# Patient Record
Sex: MALE | Race: WHITE | Employment: OTHER | URBAN - METROPOLITAN AREA
[De-identification: names, ages, dates, MRNs, and addresses within clinical notes are randomized per-mention and may not be internally consistent; named-entity substitution may affect disease eponyms.]

---

## 2017-01-16 RX ORDER — TESTOSTERONE 10 MG/.5G
GEL, METERED TOPICAL
Qty: 6 BOTTLE | Refills: 1 | Status: SHIPPED | OUTPATIENT
Start: 2017-01-16 | End: 2017-08-22 | Stop reason: SDUPTHER

## 2017-08-21 NOTE — TELEPHONE ENCOUNTER
Patient's wife, Shane Zhuo, called to ask that you contact [facility] for a refill on her 's Testosterone. Shane Zhou can be reached at:  [phone].       [facility] [phone]  Testosterone   (90-day supply)

## 2017-08-22 RX ORDER — TESTOSTERONE 10 MG/.5G
GEL, METERED TOPICAL
Qty: 6 BOTTLE | Refills: 1 | Status: SHIPPED | OUTPATIENT
Start: 2017-08-22 | End: 2017-12-26 | Stop reason: SDUPTHER

## 2017-11-29 ENCOUNTER — TELEPHONE (OUTPATIENT)
Dept: ENDOCRINOLOGY | Age: 57
End: 2017-11-29

## 2017-11-29 DIAGNOSIS — E29.1 HYPOGONADISM MALE: Primary | ICD-10-CM

## 2017-11-29 NOTE — TELEPHONE ENCOUNTER
----- Message from Tuan Pathak MD sent at 11/29/2017  6:55 AM EST -----      ----- Message -----     From: Tuan Pathak MD     Sent: 11/29/2017       To: Tuan Pathak MD    Mail him a lab slip    ---    completed

## 2017-12-14 LAB
HGB BLD-MCNC: 14.8 G/DL (ref 13–17.7)
PLEASE NOTE:, 188601: NORMAL
PSA SERPL-MCNC: 0.6 NG/ML (ref 0–4)
TESTOST SERPL-MCNC: 1012 NG/DL (ref 264–916)

## 2017-12-26 ENCOUNTER — OFFICE VISIT (OUTPATIENT)
Dept: ENDOCRINOLOGY | Age: 57
End: 2017-12-26

## 2017-12-26 VITALS
WEIGHT: 226.4 LBS | DIASTOLIC BLOOD PRESSURE: 73 MMHG | HEART RATE: 83 BPM | BODY MASS INDEX: 33.53 KG/M2 | OXYGEN SATURATION: 96 % | RESPIRATION RATE: 16 BRPM | HEIGHT: 69 IN | SYSTOLIC BLOOD PRESSURE: 109 MMHG

## 2017-12-26 DIAGNOSIS — E29.1 HYPOGONADISM MALE: Primary | ICD-10-CM

## 2017-12-26 RX ORDER — TESTOSTERONE 10 MG/.5G
GEL, METERED TOPICAL
Qty: 6 BOTTLE | Refills: 1
Start: 2017-12-26 | End: 2018-03-21 | Stop reason: SDUPTHER

## 2017-12-26 NOTE — PATIENT INSTRUCTIONS
1) For the next month, apply 3 pumps 4x/week and 2 pumps 3x/week of the testosterone gel. Then decrease to 2 pumps everyday for the next month and then repeat your labs 3-4 hours after applying in the morning and I'll e-mail you the results. 2) I will mail you a lab slip about 3-4 weeks before your next visit to have your labs repeated 3-4 days prior to your next visit.

## 2017-12-26 NOTE — MR AVS SNAPSHOT
Visit Information Date & Time Provider Department Dept. Phone Encounter #  
 12/26/2017  1:50 PM Angelica Mendes, [address] [place] [address] Diabetes and Endocrinology [id number] Follow-up Instructions Return in about 1 year (around 12/26/2018). Upcoming Health Maintenance Date Due Hepatitis C Screening 1960 DTaP/Tdap/Td series (1 - Tdap) 5/12/1982 FOBT Q 1 YEAR AGE 50-75 5/12/2011 Influenza Age 5 to Adult 8/1/2017 Allergies as of 12/26/2017  Review Complete On: 12/26/2017 By: Angelica Mendes MD  
  
 Severity Noted Reaction Type Reactions Crestor [Rosuvastatin]  08/29/2014    Other (comments) Headache Olmesartan  12/27/2016    Other (comments) Headaches Plavix [Clopidogrel]  10/18/2013    Rash Pravastatin  12/27/2016    Other (comments) Headaches and myalgias Current Immunizations  Never Reviewed No immunizations on file. Not reviewed this visit You Were Diagnosed With   
  
 Codes Comments Hypogonadism male    -  Primary ICD-10-CM: E29.1 ICD-9-CM: 257.2 Vitals BP Pulse Resp Height(growth percentile) Weight(growth percentile) SpO2  
 109/73 83 16 5' 9\" (1.753 m) 226 lb 6.4 oz (102.7 kg) 96% BMI Smoking Status 33.43 kg/m2 Former Smoker Vitals History BMI and BSA Data Body Mass Index Body Surface Area  
 33.43 kg/m 2 2.24 m 2 Preferred Pharmacy Pharmacy Name Phone [facility] - [place], [place] - [address] [place] [address] [phone] Your Updated Medication List  
  
   
This list is accurate as of: 12/26/17  2:47 PM.  Always use your most recent med list.  
  
  
  
  
 AMBIEN 5 mg tablet Generic drug:  zolpidem Take  by mouth nightly as needed for Sleep. amLODIPine-valsartan 5-160 mg per tablet Commonly known as:  Charmel Blaze Take 1 Tab by mouth daily. aspirin 81 mg chewable tablet Take 81 mg by mouth daily. testosterone 10 mg/0.5 gram /actuation Glpm  
Commonly known as:  Korin Ebenezerjairo Apply 3 pump presses 4x/week and 2 pumps 3x/week--Dose change 12/26/17--updated med list--did not send prescription to the pharmacy ZETIA 10 mg tablet Generic drug:  ezetimibe Take 10 mg by mouth daily. We Performed the Following TESTOSTERONE, TOTAL, ADULT MALE [82857 CPT(R)] Follow-up Instructions Return in about 1 year (around 12/26/2018). Patient Instructions 1) For the next month, apply 3 pumps 4x/week and 2 pumps 3x/week of the testosterone gel. Then decrease to 2 pumps everyday for the next month and then repeat your labs 3-4 hours after applying in the morning and I'll e-mail you the results. 2) I will mail you a lab slip about 3-4 weeks before your next visit to have your labs repeated 3-4 days prior to your next visit. Introducing [address]! Dear Polina Rock: Thank you for requesting a [employer] account. Our records indicate that you already have an active [employer] account. You can access your account anytime at [email or web address] Did you know that you can access your hospital and ER discharge instructions at any time in [employer]? You can also review all of your test results from your hospital stay or ER visit. Additional Information If you have questions, please visit the Frequently Asked Questions section of the [employer] website at [email or web address] Remember, [employer] is NOT to be used for urgent needs. For medical emergencies, dial 911. Now available from your iPhone and Android! Please provide this summary of care documentation to your next provider. Your primary care clinician is listed as Gaby Brown. If you have any questions after today's visit, please call [phone].

## 2017-12-26 NOTE — PROGRESS NOTES
Chief Complaint   Patient presents with    Hypogonadism     History of Present Illness: Julia Cheek is a 65 y.o. male here for follow up of hypogonadism. Weight down 1 lbs since last visit in 12/16. Underwent some skin cancer lesions from his back and left buttock over the past year but no other change to his health. Still applying 3 pumps every morning. Lab draw was 3.5 hours after he applied. About 6-8 weeks ago, did try decreasing his dose of fortesta to 2 pumps daily for 2 weeks and didn't feel as well so he went back to 3 pumps per day. May get some chest pain on rare occasions with activity. Does give blood every 54 days and the last time he did this was at the beginning of November. Tends to have more tightness in his legs prior to needing to give blood but feels better after he does this. No trouble with urination. Current Outpatient Prescriptions   Medication Sig    testosterone (FORTESTA) 10 mg/0.5 gram /actuation glpm Apply 3 pump presses daily    amLODIPine-valsartan (EXFORGE) 5-160 mg per tablet Take 1 Tab by mouth daily.  ZETIA 10 mg tablet Take 10 mg by mouth daily.  aspirin 81 mg chewable tablet Take 81 mg by mouth daily.  zolpidem (AMBIEN) 5 mg tablet Take  by mouth nightly as needed for Sleep. No current facility-administered medications for this visit. Allergies   Allergen Reactions    Crestor [Rosuvastatin] Other (comments)     Headache      Olmesartan Other (comments)     Headaches      Plavix [Clopidogrel] Rash    Pravastatin Other (comments)     Headaches and myalgias     Review of Systems:  - Cardiovascular: rare chest pain  - Neurological: no tremors  - Integumentary: skin is normal    Physical Examination:  Blood pressure 109/73, pulse 83, resp. rate 16, height 5' 9\" (1.753 m), weight 226 lb 6.4 oz (102.7 kg), SpO2 96 %.   - General: pleasant, no distress, good eye contact   - Neck: no carotid bruits  - Cardiovascular: regular, normal rate, nl s1 and s2, no m/r/g   - Respiratory: clear to auscultation bilaterally   - Integumentary: skin is normal, no edema   - Gu: prostate non-enlarged, smooth, no nodules, normal rectal tone, no rectal masses  - Psychiatric: normal mood and affect    Data Reviewed:   Component      Latest Ref Rng & Units 12/13/2017 12/13/2017 12/13/2017          12:00 AM 12:00 AM 12:00 AM   Testosterone      264 - 916 ng/dL   1012 (H)   HGB      13.0 - 17.7 g/dL  14.8    Prostate Specific Ag      0.0 - 4.0 ng/mL 0.6         Assessment/Plan:     1. Hypogonadism male: Around age 56, was having fatigue, irritability, low libido and ED. Saw a urologist, Dr. Willis Faith, in [facility] and was recalls his testosterone level was 212 and doesn't recall being evaluated for the cause of the low testosterone. 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 His wife was taught how to give injections and had done this the entire time. Injected testosterone cypionate 200 mg/ml 2 ml = 400 mg every 2 weeks in the upper buttock area and rotates sides. Had been on this dose almost since the beginning. In the summer of 2013, he stopped testosterone for about a month and his level was around 30 off injection. In 8/14 his level was 1046 about 8 days after the injection and his Hgb was slightly high at 17.2. He started donating blood every 56 days prior to being on testosterone and had continued doing this while on testosterone. Repeat level was 1410 5 days after an injection in 8/14 and Hgb was 16.7 so I decreased his dose to 300 mg IM every 2 weeks and has remained on this since then. His prolactin level was normal in 8/14 and his ferritin was slightly low, not high, so he doesn't have hemochromatosis.   Given his CAD, I don't want him on too much of a dose and don't want his Hgb too high to risk a hyperviscous state. His testosterone level was 879 and Hgb 15.8 on 1/5/15. He wanted to change to gel for more consistent levels as he does seem to have a peak and trough effect and these are less likely to raise Hgb level and we changed to fortesta 4 pumps daily at the end of April 2015 and after 3 weeks of application his level was 351 but this was without applying gel the morning of his lab draw. Hgb was 17.7 but was due for blood donation. Repeat level in 9/15 was > 1500 so decreased to 3 pumps per day and level was 748 in 12/15 and 938 in 12/16 so kept his dose the same. T up to 1012 in 12/17 so will taper to 2 pumps 3x/week and 3 pumps 4x/week for 1 month and then down to 2 pumps daily for the next month and then repeat labs and continue to see him annually. - decrease fortesta 2% to 3 pump presses 4x/week and 2 pumps 3x/week for 1 month and then 2 pumps daily  - check total testosterone in 2 months  - check total testosterone and Hgb and PSA prior to next visit   - SOFI done 12/16  - Hgb 16.2 in 12/15, 15.7 in 12/16 and 14.8 in 12/17  - PSA 0.6 in 12/15, 0.7 in 12/16 and 0.6 in 12/17     Patient Instructions   1) For the next month, apply 3 pumps 4x/week and 2 pumps 3x/week of the testosterone gel. Then decrease to 2 pumps everyday for the next month and then repeat your labs 3-4 hours after applying in the morning and I'll e-mail you the results. 2) I will mail you a lab slip about 3-4 weeks before your next visit to have your labs repeated 3-4 days prior to your next visit. Follow-up Disposition:  Return in about 1 year (around 12/26/2018).     Copy sent to:  Dr. Jana Shen follow up: 3/15/18  Component      Latest Ref Rng & Units 3/14/2018          12:00 AM   Testosterone      264 - 916 ng/dL 949 (H)     Sent him the following message through [employer]:  After I saw you in December I asked you to taper your testosterone for one month by applying 3 pumps 4x/week and 2 pumps 3x/week. Then decrease to 2 pumps everyday for the next month and then repeat your labs. Do your labs reflect being on 2 pumps/day or some other dose as your testosterone level is still high at 949 so I will need to decrease your dose from whatever you are currently taking? Let me know when you have a chance. He responded that he had been applying 2 pumps per day so I recommended deceasing to one pump per day. Lab follow up: 5/5/18  Component      Latest Ref Rng & Units 5/3/2018 5/3/2018          12:00 AM 12:00 AM   Testosterone      264 - 916 ng/dL  142 (L)   HGB      13.0 - 17.7 g/dL 15.7      Sent him the following message through [employer]:    Your testosterone is now low at 142. Have you been applying one pump everyday? Have you missed any doses or run out prior to the lab draw? Let me know when you have a chance. Addendum: 5/7/18    We had the following e-mail exchange:    Stephanie Ovalle put this in the mail today. Be sure to apply the gel that morning and repeat your lab 3-4 hours after application.  ===View-only below this line===      ----- Message -----     From: Chloé Rooney     Sent: 5/6/2018 11:55 PM EDT       To: Pk Peterson MD  Subject: RE:lab results    I have been doing one pump a day and I have not run out, however, I had to go for my physical the same day and I am not sure what time I applied the testosterone, or if I forgot all together. Please mail me another lab order and I will repeat the blood test.  Sorry for my mistake. Lab follow up: 5/18/18  Component      Latest Ref Rng & Units 5/17/2018          12:00 AM   Testosterone      264 - 916 ng/dL 740     Sent him the following message through [employer]:  Your testosterone is back to normal at 740. Please stay on one pump of testosterone per day.

## 2018-03-15 LAB — TESTOST SERPL-MCNC: 949 NG/DL (ref 264–916)

## 2018-03-21 ENCOUNTER — TELEPHONE (OUTPATIENT)
Dept: ENDOCRINOLOGY | Age: 58
End: 2018-03-21

## 2018-03-21 DIAGNOSIS — E29.1 HYPOGONADISM MALE: ICD-10-CM

## 2018-03-21 DIAGNOSIS — E29.1 HYPOGONADISM MALE: Primary | ICD-10-CM

## 2018-03-21 RX ORDER — TESTOSTERONE 10 MG/.5G
GEL, METERED TOPICAL
Qty: 6 BOTTLE | Refills: 1
Start: 2018-03-21 | End: 2018-06-06 | Stop reason: SDUPTHER

## 2018-03-21 NOTE — TELEPHONE ENCOUNTER
Patient and spouse was notified of the message noted and she stated that he has being doing the two pumps everyday. She stated that she will change the notification to be able to receive the [facility] messages.

## 2018-05-04 LAB
HGB BLD-MCNC: 15.7 G/DL (ref 13–17.7)
TESTOST SERPL-MCNC: 142 NG/DL (ref 264–916)

## 2018-05-18 LAB — TESTOST SERPL-MCNC: 740 NG/DL (ref 264–916)

## 2018-06-05 ENCOUNTER — TELEPHONE (OUTPATIENT)
Dept: ENDOCRINOLOGY | Age: 58
End: 2018-06-05

## 2018-06-05 NOTE — TELEPHONE ENCOUNTER
Please call pt to let him know he has an unread message in [address] I'm confused, what do you mean they won't cover this?  Did their formulary (list of preferred meds) change?  Are you trying to fill this somewhere else and pay cash?  I'm happy to mail this to you but first please just clarify this. Darryl Briggs.          Previous Messages       ----- Message -----   León Campoverde From: Ron Haynes Sent: 6/3/2018  6:57 PM EDT        To: Kristina Levine MD   Subject: Prescription Question     Can you please send me a prescription for 90 days for my testosterone (for 2 pumps per day)?  I want to use a different pharmacy rather than [facility]. [facility] insurance will not cover this medication.

## 2018-06-06 DIAGNOSIS — E29.1 HYPOGONADISM MALE: ICD-10-CM

## 2018-06-06 RX ORDER — TESTOSTERONE 10 MG/.5G
GEL, METERED TOPICAL
Qty: 3 BOTTLE | Refills: 1 | Status: SHIPPED | OUTPATIENT
Start: 2018-06-06 | End: 2018-06-06 | Stop reason: SDUPTHER

## 2018-06-06 RX ORDER — TESTOSTERONE 10 MG/.5G
GEL, METERED TOPICAL
Qty: 3 BOTTLE | Refills: 1
Start: 2018-06-06 | End: 2019-12-30 | Stop reason: SDUPTHER

## 2018-08-20 ENCOUNTER — TELEPHONE (OUTPATIENT)
Dept: ENDOCRINOLOGY | Age: 58
End: 2018-08-20

## 2018-08-20 NOTE — TELEPHONE ENCOUNTER
Sent him the following message through [employer] and left a vm on his cell phone as listed in the chart:    I spoke with Oliver Kaba from [place] and worked on a prior authorization for your generic testosterone gel over the phone. I was able to get this approved from 8/20/18-8/20/19 with prior authorization number [id number]. She said you should be able to fill your testosterone at your local [facility] to see the cost of this. Let me know if you have any questions about this or trouble getting it filled. The prescription I had given you said 2 pumps daily but I still want you to just apply 1 pump daily.

## 2018-12-30 DIAGNOSIS — E29.1 HYPOGONADISM MALE: Primary | ICD-10-CM

## 2019-01-10 LAB
HGB BLD-MCNC: 14.9 G/DL (ref 13–17.7)
PSA SERPL-MCNC: 1 NG/ML (ref 0–4)
TESTOST SERPL-MCNC: 703 NG/DL (ref 264–916)

## 2019-01-21 ENCOUNTER — OFFICE VISIT (OUTPATIENT)
Dept: ENDOCRINOLOGY | Age: 59
End: 2019-01-21

## 2019-01-21 VITALS
DIASTOLIC BLOOD PRESSURE: 66 MMHG | HEIGHT: 69 IN | BODY MASS INDEX: 33.06 KG/M2 | WEIGHT: 223.2 LBS | HEART RATE: 73 BPM | SYSTOLIC BLOOD PRESSURE: 101 MMHG

## 2019-01-21 DIAGNOSIS — E29.1 HYPOGONADISM MALE: Primary | ICD-10-CM

## 2019-01-21 NOTE — PROGRESS NOTES
Chief Complaint   Patient presents with    Other     Hypogonadism     History of Present Illness: Neil Bright is a 63 y.o. male here for follow up of hypogonadism  Weight down 3 lbs since last visit in 12/18. Since the spring of 2018 has been applying 1 pump per day and tries to alternate thighs. On this dose overall energy is still pretty good. Still giving blood at [facility] every 2 months. Muscle strength is good. No decrease in sex drive or function. Having some fatigue during the day and unclear if he could be on too much blood pressure med so advised him to discuss this with his PCP. Current Outpatient Medications   Medication Sig    testosterone (FORTESTA) 10 mg/0.5 gram /actuation glpm Apply 1 pump daily--Dose change 3/21/18--updated med list--did not send prescription to the pharmacy    amLODIPine-valsartan (EXFORGE) 5-160 mg per tablet Take 1 Tab by mouth daily.  ZETIA 10 mg tablet Take 10 mg by mouth daily.  aspirin 81 mg chewable tablet Take 81 mg by mouth daily.  zolpidem (AMBIEN) 5 mg tablet Take  by mouth nightly as needed for Sleep. No current facility-administered medications for this visit. Allergies   Allergen Reactions    Crestor [Rosuvastatin] Other (comments)     Headache      Olmesartan Other (comments)     Headaches      Plavix [Clopidogrel] Rash    Pravastatin Other (comments)     Headaches and myalgias     Review of Systems:  - Cardiovascular: no chest pain  - Neurological: no tremors  - Integumentary: skin is normal    Physical Examination:  Blood pressure 101/66, pulse 73, height 5' 9\" (1.753 m), weight 223 lb 3.2 oz (101.2 kg).   - General: pleasant, no distress, good eye contact   - Neck: no carotid bruits  - Cardiovascular: regular, normal rate, nl s1 and s2, no m/r/g   - Respiratory: clear to auscultation bilaterally   - Integumentary: skin is normal, no edema  - Psychiatric: normal mood and affect    Data Reviewed:   Component      Latest Ref Rng & Units 1/9/2019 1/9/2019 1/9/2019          12:00 AM 12:00 AM 12:00 AM   Testosterone      264 - 916 ng/dL   703   Prostate Specific Ag      0.0 - 4.0 ng/mL  1.0    HGB      13.0 - 17.7 g/dL 14.9         Assessment/Plan:     1. Hypogonadism male: Around age 56, was having fatigue, irritability, low libido and ED. Saw a urologist, Dr. Tati Hinds, in [address] [place] [address] and was recalls his testosterone level was 212 and doesn't recall being evaluated for the cause of the low testosterone. 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 His wife was taught how to give injections and had done this the entire time. Injected testosterone cypionate 200 mg/ml 2 ml = 400 mg every 2 weeks in the upper buttock area and rotates sides. Had been on this dose almost since the beginning. In the summer of 2013, he stopped testosterone for about a month and his level was around 30 off injection. In 8/14 his level was 1046 about 8 days after the injection and his Hgb was slightly high at 17.2. He started donating blood every 56 days prior to being on testosterone and had continued doing this while on testosterone. Repeat level was 1410 5 days after an injection in 8/14 and Hgb was 16.7 so I decreased his dose to 300 mg IM every 2 weeks and has remained on this since then. His prolactin level was normal in 8/14 and his ferritin was slightly low, not high, so he doesn't have hemochromatosis. Given his CAD, I don't want him on too much of a dose and don't want his Hgb too high to risk a hyperviscous state. His testosterone level was 879 and Hgb 15.8 on 1/5/15.  He wanted to change to gel for more consistent levels as he does seem to have a peak and trough effect and these are less likely to raise Hgb level and we changed to fortesta 4 pumps daily at the end of April 2015 and after 3 weeks of application his level was 351 but this was without applying gel the morning of his lab draw. Hgb was 17.7 but was due for blood donation. Repeat level in 9/15 was > 1500 so decreased to 3 pumps per day and level was 748 in 12/15 and 938 in 12/16 so kept his dose the same. T up to 1012 in 12/17 so tapered to 2 pumps 3x/week and 3 pumps 4x/week for 1 month and then down to 2 pumps daily for the next month and then repeat labs in 3/18 showed T level was still high at 949 so decreased to 1 pump daily and T level low at 142 in 5/18 but didn't apply the gel that morning and repeat 2 weeks later was normal at 765 and still normal at 703 in 1/19 so kept dose the same. - cont fortesta 2% to 1 pump daily  - check total testosterone and Hgb and PSA prior to next visit   - SOFI done 12/16  - Hgb 16.2 in 12/15, 15.7 in 12/16 and 14.8 in 12/17 and 14.9 in 1/19  - PSA 0.6 in 12/15, 0.7 in 12/16 and 0.6 in 12/17 and 1.0 in 1/19     Patient Instructions   1) Your testosterone is still normal on 1 pump per day so stay on this dose. 2) Consider discussing with your PCP about cutting back on your blood pressure pill to 1/2 tab daily or changing to a different one. 3) Your hemoglobin (red blood cell count) is normal and your PSA (prostate cancer blood test) is normal.    4) Let me know when you need a refill on the testosterone. Follow-up Disposition:  Return in about 1 year (around 1/21/2020).     Copy sent to:  Dr. Jarred Alvarado

## 2019-01-21 NOTE — PATIENT INSTRUCTIONS
1) Your testosterone is still normal on 1 pump per day so stay on this dose. 2) Consider discussing with your PCP about cutting back on your blood pressure pill to 1/2 tab daily or changing to a different one. 3) Your hemoglobin (red blood cell count) is normal and your PSA (prostate cancer blood test) is normal.    4) Let me know when you need a refill on the testosterone.

## 2019-10-03 NOTE — PROGRESS NOTES
Called patient for precall and found patient is waiting on lab results and discussion with Dr Romayne Ingle prior to scheduling biopsy. Will cancel current appt and wait for patient or wife to call back to reschedule. Prefers Monday for patient to have day off from work.

## 2019-10-11 RX ORDER — HYDROCHLOROTHIAZIDE 25 MG/1
25 TABLET ORAL DAILY
Status: ON HOLD | COMMUNITY
End: 2019-10-28

## 2019-10-11 RX ORDER — BUMETANIDE 1 MG/1
0.5 TABLET ORAL AS NEEDED
COMMUNITY
End: 2022-01-17

## 2019-10-11 RX ORDER — TELMISARTAN 40 MG/1
40 TABLET ORAL DAILY
COMMUNITY
End: 2020-01-20

## 2019-10-11 NOTE — PROGRESS NOTES
Natell completed with pt's wife, Silvia Lopez:  PT aware of NPO status. NPO after MN night prior to procedure. PT aware of need to hold anticoagulants per protocol. Aware to stop aspirin 7 days prior to procedure. PT aware of potential for sedation administration and need for  at discharge. PT aware of arrival time pre procedure. Arrive at [facility] radiology waiting room on 10/28/19 at 0915 for scheduled procedure to occur at 1045. Pt states no questions at this time. Gave pt [facility] radiology rn phone number [phone].

## 2019-10-16 ENCOUNTER — HOSPITAL ENCOUNTER (OUTPATIENT)
Dept: CT IMAGING | Age: 59
Discharge: HOME | End: 2019-10-16
Attending: INTERNAL MEDICINE

## 2019-10-28 ENCOUNTER — HOSPITAL ENCOUNTER (OUTPATIENT)
Dept: CT IMAGING | Age: 59
Discharge: HOME | End: 2019-10-28
Attending: INTERNAL MEDICINE | Admitting: INTERNAL MEDICINE
Payer: COMMERCIAL

## 2019-10-28 VITALS
WEIGHT: 221.56 LBS | HEART RATE: 97 BPM | OXYGEN SATURATION: 97 % | HEIGHT: 69 IN | DIASTOLIC BLOOD PRESSURE: 83 MMHG | BODY MASS INDEX: 32.82 KG/M2 | SYSTOLIC BLOOD PRESSURE: 139 MMHG | TEMPERATURE: 98 F | RESPIRATION RATE: 16 BRPM

## 2019-10-28 DIAGNOSIS — N18.30 CHRONIC KIDNEY DISEASE, STAGE III (MODERATE) (HCC): ICD-10-CM

## 2019-10-28 LAB
ANION GAP SERPL CALC-SCNC: 3 MMOL/L (ref 3–18)
APTT PPP: 27.7 SEC (ref 23–36.4)
BASOPHILS # BLD: 0 K/UL (ref 0–0.1)
BASOPHILS NFR BLD: 0 % (ref 0–2)
BUN SERPL-MCNC: 19 MG/DL (ref 7–18)
BUN/CREAT SERPL: 21 (ref 12–20)
CALCIUM SERPL-MCNC: 7.5 MG/DL (ref 8.5–10.1)
CHLORIDE SERPL-SCNC: 109 MMOL/L (ref 100–111)
CO2 SERPL-SCNC: 31 MMOL/L (ref 21–32)
CREAT SERPL-MCNC: 0.89 MG/DL (ref 0.6–1.3)
DIFFERENTIAL METHOD BLD: ABNORMAL
EOSINOPHIL # BLD: 0.5 K/UL (ref 0–0.4)
EOSINOPHIL NFR BLD: 8 % (ref 0–5)
ERYTHROCYTE [DISTWIDTH] IN BLOOD BY AUTOMATED COUNT: 12.9 % (ref 11.6–14.5)
GLUCOSE SERPL-MCNC: 91 MG/DL (ref 74–99)
HCT VFR BLD AUTO: 37.1 % (ref 36–48)
HGB BLD-MCNC: 12.6 G/DL (ref 13–16)
INR PPP: 1 (ref 0.8–1.2)
LYMPHOCYTES # BLD: 1 K/UL (ref 0.9–3.6)
LYMPHOCYTES NFR BLD: 15 % (ref 21–52)
MCH RBC QN AUTO: 29.4 PG (ref 24–34)
MCHC RBC AUTO-ENTMCNC: 34 G/DL (ref 31–37)
MCV RBC AUTO: 86.7 FL (ref 74–97)
MONOCYTES # BLD: 0.5 K/UL (ref 0.05–1.2)
MONOCYTES NFR BLD: 8 % (ref 3–10)
NEUTS SEG # BLD: 4.5 K/UL (ref 1.8–8)
NEUTS SEG NFR BLD: 69 % (ref 40–73)
PLATELET # BLD AUTO: 218 K/UL (ref 135–420)
PMV BLD AUTO: 9.4 FL (ref 9.2–11.8)
POTASSIUM SERPL-SCNC: 4.5 MMOL/L (ref 3.5–5.5)
PROTHROMBIN TIME: 12.7 SEC (ref 11.5–15.2)
RBC # BLD AUTO: 4.28 M/UL (ref 4.7–5.5)
SODIUM SERPL-SCNC: 143 MMOL/L (ref 136–145)
WBC # BLD AUTO: 6.5 K/UL (ref 4.6–13.2)

## 2019-10-28 PROCEDURE — 88305 TISSUE EXAM BY PATHOLOGIST: CPT

## 2019-10-28 PROCEDURE — 85730 THROMBOPLASTIN TIME PARTIAL: CPT

## 2019-10-28 PROCEDURE — 85610 PROTHROMBIN TIME: CPT

## 2019-10-28 PROCEDURE — 99152 MOD SED SAME PHYS/QHP 5/>YRS: CPT

## 2019-10-28 PROCEDURE — 80048 BASIC METABOLIC PNL TOTAL CA: CPT

## 2019-10-28 PROCEDURE — 50200 RENAL BIOPSY PERQ: CPT

## 2019-10-28 PROCEDURE — 74011250636 HC RX REV CODE- 250/636

## 2019-10-28 PROCEDURE — 74011000250 HC RX REV CODE- 250

## 2019-10-28 PROCEDURE — 74011250636 HC RX REV CODE- 250/636: Performed by: RADIOLOGY

## 2019-10-28 PROCEDURE — 74011250637 HC RX REV CODE- 250/637: Performed by: RADIOLOGY

## 2019-10-28 PROCEDURE — 85025 COMPLETE CBC W/AUTO DIFF WBC: CPT

## 2019-10-28 RX ORDER — SODIUM CHLORIDE 9 MG/ML
25 INJECTION, SOLUTION INTRAVENOUS CONTINUOUS
Status: DISCONTINUED | OUTPATIENT
Start: 2019-10-28 | End: 2019-10-28 | Stop reason: HOSPADM

## 2019-10-28 RX ORDER — NALOXONE HYDROCHLORIDE 0.4 MG/ML
INJECTION, SOLUTION INTRAMUSCULAR; INTRAVENOUS; SUBCUTANEOUS
Status: DISCONTINUED
Start: 2019-10-28 | End: 2019-10-28 | Stop reason: WASHOUT

## 2019-10-28 RX ORDER — MIDAZOLAM HYDROCHLORIDE 1 MG/ML
.5-4 INJECTION, SOLUTION INTRAMUSCULAR; INTRAVENOUS
Status: DISCONTINUED | OUTPATIENT
Start: 2019-10-28 | End: 2019-10-28 | Stop reason: HOSPADM

## 2019-10-28 RX ORDER — FLAXSEED OIL 1000 MG
CAPSULE ORAL
COMMUNITY
End: 2021-01-18

## 2019-10-28 RX ORDER — VITAMIN E CAP 100 UNIT 100 UNIT
CAP ORAL DAILY
COMMUNITY

## 2019-10-28 RX ORDER — ACETAMINOPHEN 325 MG/1
650 TABLET ORAL
Status: DISCONTINUED | OUTPATIENT
Start: 2019-10-28 | End: 2019-10-28 | Stop reason: HOSPADM

## 2019-10-28 RX ORDER — LIDOCAINE HYDROCHLORIDE 10 MG/ML
INJECTION INFILTRATION; PERINEURAL
Status: COMPLETED
Start: 2019-10-28 | End: 2019-10-28

## 2019-10-28 RX ORDER — LIDOCAINE HYDROCHLORIDE 10 MG/ML
1-20 INJECTION INFILTRATION; PERINEURAL
Status: DISCONTINUED | OUTPATIENT
Start: 2019-10-28 | End: 2019-10-28 | Stop reason: HOSPADM

## 2019-10-28 RX ORDER — HYDRALAZINE HYDROCHLORIDE 20 MG/ML
20 INJECTION INTRAMUSCULAR; INTRAVENOUS ONCE
Status: COMPLETED | OUTPATIENT
Start: 2019-10-28 | End: 2019-10-28

## 2019-10-28 RX ORDER — HYDRALAZINE HYDROCHLORIDE 20 MG/ML
INJECTION INTRAMUSCULAR; INTRAVENOUS
Status: COMPLETED
Start: 2019-10-28 | End: 2019-10-28

## 2019-10-28 RX ORDER — FENTANYL CITRATE 50 UG/ML
INJECTION, SOLUTION INTRAMUSCULAR; INTRAVENOUS
Status: COMPLETED
Start: 2019-10-28 | End: 2019-10-28

## 2019-10-28 RX ORDER — MIDAZOLAM HYDROCHLORIDE 1 MG/ML
INJECTION, SOLUTION INTRAMUSCULAR; INTRAVENOUS
Status: DISCONTINUED
Start: 2019-10-28 | End: 2019-10-28 | Stop reason: WASHOUT

## 2019-10-28 RX ORDER — HYDROCODONE BITARTRATE AND ACETAMINOPHEN 5; 325 MG/1; MG/1
1 TABLET ORAL
Status: DISCONTINUED | OUTPATIENT
Start: 2019-10-28 | End: 2019-10-28 | Stop reason: HOSPADM

## 2019-10-28 RX ORDER — FENTANYL CITRATE 50 UG/ML
25-200 INJECTION, SOLUTION INTRAMUSCULAR; INTRAVENOUS
Status: DISCONTINUED | OUTPATIENT
Start: 2019-10-28 | End: 2019-10-28 | Stop reason: HOSPADM

## 2019-10-28 RX ORDER — SIMVASTATIN 10 MG/1
10 TABLET, FILM COATED ORAL
COMMUNITY
End: 2022-01-17

## 2019-10-28 RX ORDER — HYDRALAZINE HYDROCHLORIDE 20 MG/ML
10 INJECTION INTRAMUSCULAR; INTRAVENOUS
Status: COMPLETED | OUTPATIENT
Start: 2019-10-28 | End: 2019-10-28

## 2019-10-28 RX ORDER — FLUMAZENIL 0.1 MG/ML
INJECTION INTRAVENOUS
Status: DISCONTINUED
Start: 2019-10-28 | End: 2019-10-28 | Stop reason: WASHOUT

## 2019-10-28 RX ORDER — FLUMAZENIL 0.1 MG/ML
0.2 INJECTION INTRAVENOUS
Status: DISCONTINUED | OUTPATIENT
Start: 2019-10-28 | End: 2019-10-28 | Stop reason: HOSPADM

## 2019-10-28 RX ORDER — ACETAMINOPHEN 325 MG/1
650 TABLET ORAL
COMMUNITY
End: 2022-01-17

## 2019-10-28 RX ORDER — NALOXONE HYDROCHLORIDE 0.4 MG/ML
0.4 INJECTION, SOLUTION INTRAMUSCULAR; INTRAVENOUS; SUBCUTANEOUS AS NEEDED
Status: DISCONTINUED | OUTPATIENT
Start: 2019-10-28 | End: 2019-10-28 | Stop reason: HOSPADM

## 2019-10-28 RX ORDER — LANOLIN ALCOHOL/MO/W.PET/CERES
400 CREAM (GRAM) TOPICAL DAILY
COMMUNITY
End: 2021-01-18

## 2019-10-28 RX ADMIN — HYDRALAZINE HYDROCHLORIDE 10 MG: 20 INJECTION INTRAMUSCULAR; INTRAVENOUS at 11:28

## 2019-10-28 RX ADMIN — LIDOCAINE HYDROCHLORIDE 7 ML: 10 INJECTION INFILTRATION; PERINEURAL at 11:41

## 2019-10-28 RX ADMIN — LIDOCAINE HYDROCHLORIDE 7 ML: 10 INJECTION, SOLUTION INFILTRATION; PERINEURAL at 11:41

## 2019-10-28 RX ADMIN — FENTANYL CITRATE 50 MCG: 50 INJECTION INTRAMUSCULAR; INTRAVENOUS at 11:25

## 2019-10-28 RX ADMIN — FENTANYL CITRATE 50 MCG: 50 INJECTION INTRAMUSCULAR; INTRAVENOUS at 11:15

## 2019-10-28 RX ADMIN — HYDRALAZINE HYDROCHLORIDE 10 MG: 20 INJECTION INTRAMUSCULAR; INTRAVENOUS at 11:35

## 2019-10-28 RX ADMIN — SODIUM CHLORIDE 25 ML/HR: 900 INJECTION, SOLUTION INTRAVENOUS at 09:33

## 2019-10-28 RX ADMIN — HYDROCODONE BITARTRATE AND ACETAMINOPHEN 1 TABLET: 5; 325 TABLET ORAL at 13:13

## 2019-10-28 NOTE — PROGRESS NOTES
Vascular & Interventional Radiology Brief Procedure Note    Interventional Radiologist: Carlita Trammell MD    Assistants: None    Pre-operative Diagnosis:  Renal failure    Post-operative Diagnosis: Same as pre-op dx    Procedure(s) Performed:  CT guided left medical renal biopsy    Anesthesia:  Local and IV fentanyl    Findings:  4 good cores    Complications: No immediate    Estimated Blood Loss:  minimal    Tubes and Drains: None    Specimens: processed by path present at procedure    Condition: Good    Disposition:   To recovery    Plan: discharge      Carlita Trammell MD  [facility]  Vascular & Interventional Radiology  10/28/2019

## 2019-10-28 NOTE — DISCHARGE INSTRUCTIONS
Patient Education        Needle Kidney Biopsy: What to Expect at Home  Your Recovery    After a needle kidney biopsy, you will be told to lie down on your back for several hours. After this, you should avoid strenuous activity for the next 2 to 3 days. It's normal to feel some soreness in the area of the biopsy for 2 to 3 days. You may have a small amount of bleeding on the bandage after the biopsy. You may notice some blood in your urine after the biopsy. This should go away within 12 to 24 hours. If it doesn't, call your doctor. If you are feeling well, you should be able to get back to work within a week. But avoid strenuous activity, such as heavy lifting, for up to another week. This care sheet gives you a general idea about how long it will take for you to recover. But each person recovers at a different pace. Follow the steps below to feel better as quickly as possible. How can you care for yourself at home? Activity    · Avoid lifting anything that would make you strain. This may include heavy grocery bags and milk containers, a heavy briefcase or backpack, cat litter or dog food bags, a vacuum , or a child.     · Avoid strenuous activities, such as bicycle riding, jogging, weight lifting, or aerobic exercise, until your doctor says it is okay.     · Try to walk each day. Start by walking a little more than you did the day before. Bit by bit, increase the amount you walk. Walking boosts blood flow and helps prevent pneumonia and constipation.     · Rest when you feel tired. Getting enough sleep will help you recover.     · Ask your doctor when it is okay for you to have sex. Diet    · You can eat your normal diet. If your stomach is upset, try bland, low-fat foods like plain rice, broiled chicken, toast, and yogurt.     · Drink plenty of fluids to avoid becoming dehydrated. Medicines    · Your doctor will tell you if and when you can restart your medicines.  He or she will also give you instructions about taking any new medicines.     · If you take blood thinners, such as warfarin (Coumadin), clopidogrel (Plavix), or aspirin, be sure to talk to your doctor. He or she will tell you if and when to start taking those medicines again. Make sure that you understand exactly what your doctor wants you to do.     · Do not take aspirin or anti-inflammatory medicines for a week after the biopsy. Incision care    · Keep a bandage over the puncture site for the first 1 or 2 days. Follow-up care is a key part of your treatment and safety. Be sure to make and go to all appointments, and call your doctor if you are having problems. It's also a good idea to know your test results and keep a list of the medicines you take. When should you call for help? Call 911 anytime you think you may need emergency care. For example, call if:    · You passed out (lost consciousness).    Call your doctor now or seek immediate medical care if:    · You have signs of infection, such as:  ? Increased pain, swelling, warmth, or redness. ? Red streaks leading from the puncture site. ? Pus draining from the puncture site. ? A fever.     · Bright red blood has soaked through the bandage over the puncture site.     · You have new or worse pain at the puncture site.    Watch closely for any changes in your health, and call your doctor if:    · You have blood in your urine for more than 1 day. Where can you learn more? Go to http://anni-jose.info/. Enter S675 in the search box to learn more about \"Needle Kidney Biopsy: What to Expect at Home. \"  Current as of: October 31, 2018  Content Version: 12.2  © 6299-3553 [employer]wise, Inc[employer]. Care instructions adapted under license by [employer] (which disclaims liability or warranty for this information).  If you have questions about a medical condition or this instruction, always ask your healthcare professional. [address]  any warranty or liability for your use of this information. DISCHARGE SUMMARY from Nurse    PATIENT INSTRUCTIONS:    After general anesthesia or intravenous sedation, for 24 hours or while taking prescription Narcotics:  · Limit your activities  · Do not drive and operate hazardous machinery  · Do not make important personal or business decisions  · Do  not drink alcoholic beverages  · If you have not urinated within 8 hours after discharge, please contact your surgeon on call. Report the following to your surgeon:  · Excessive pain, swelling, redness or odor of or around the surgical area  · Temperature over 100.5  · Nausea and vomiting lasting longer than 4 hours or if unable to take medications  · Any signs of decreased circulation or nerve impairment to extremity: change in color, persistent  numbness, tingling, coldness or increase pain  · Any questions    What to do at Home:  Recommended activity: Activity as tolerated,    *  Please give a list of your current medications to your Primary Care Provider. *  Please update this list whenever your medications are discontinued, doses are      changed, or new medications (including over-the-counter products) are added. *  Please carry medication information at all times in case of emergency situations. These are general instructions for a healthy lifestyle:    No smoking/ No tobacco products/ Avoid exposure to second hand smoke  Surgeon General's Warning:  Quitting smoking now greatly reduces serious risk to your health.     Obesity, smoking, and sedentary lifestyle greatly increases your risk for illness    A healthy diet, regular physical exercise & weight monitoring are important for maintaining a healthy lifestyle    You may be retaining fluid if you have a history of heart failure or if you experience any of the following symptoms:  Weight gain of 3 pounds or more overnight or 5 pounds in a week, increased swelling in our hands or feet or shortness of breath while lying flat in bed. Please call your doctor as soon as you notice any of these symptoms; do not wait until your next office visit. The discharge information has been reviewed with the patient and spouse. The patient and spouse verbalized understanding. Discharge medications reviewed with the patient and spouse and appropriate educational materials and side effects teaching were provided.     Patient armband removed and shredded    ___________________________________________________________________________________________________________________________________

## 2019-10-28 NOTE — PROGRESS NOTES
Back from procedure. Band-aid intact to left flank, dry & intact. 1300 Diet given. 1313 Pain med given for h/a 6/10. See mar. 1400 h/a improved 1/10  1530 Ambulated to bathroom, voided clear yellow urine. Back to bed. 1545 Discharge instructions reviewed, verbalized understanding. 1600 Discharged in home in care of wife in stable condition via w/c. Denies pain. Band-aid to left flank dry & intact.

## 2019-10-28 NOTE — PROGRESS NOTES
The patient is an appropriate candidate to undergo CT guided medical renal biopsy. Patient assessed immediately prior to induction. Anesthesia plan as follows: Local    Planned agent(s):  fentanyl    History and Physical update:  H&P was reviewed and the patient was examined. No changes have occurred in the patient's condition since the H&P was completed.     Everton Corona MD  Vascular & Interventional Radiology  [facility]  10/28/2019

## 2019-10-28 NOTE — PROGRESS NOTES
TRANSFER - OUT REPORT:    Verbal report given to GEO Adan(name) on Jamshid Decker  being transferred to Care Unit(unit) for routine progression of care       Report consisted of patients Situation, Background, Assessment and   Recommendations(SBAR). Information from the following report(s) SBAR, Kardex and MAR was reviewed with the receiving nurse. Lines:   Peripheral IV 10/28/19 Anterior;Proximal;Right Forearm (Active)   Site Assessment Clean, dry, & intact 10/28/2019  9:32 AM   Phlebitis Assessment 0 10/28/2019  9:32 AM   Infiltration Assessment 0 10/28/2019  9:32 AM   Dressing Status Clean, dry, & intact 10/28/2019  9:32 AM   Hub Color/Line Status Capped;Pink;Flushed; Infusing 10/28/2019  9:32 AM   Action Taken Open ports on tubing capped 10/28/2019  9:32 AM   Alcohol Cap Used Yes 10/28/2019  9:32 AM        Opportunity for questions and clarification was provided.       Patient transported with:   Registered Nurse

## 2019-10-28 NOTE — ROUTINE PROCESS
[address] October 28, 2019       RE: John Anne      To Whom It May Concern,    This is to certify that John Anne may  return to work on Wednesday October 30, 2019 without any restrictions. Thank you for your assistance in this matter.       Sincerely,  Fred Welch RN

## 2019-11-18 RX ORDER — METFORMIN HYDROCHLORIDE 1000 MG/1
1000 TABLET ORAL 2 TIMES DAILY WITH MEALS
COMMUNITY
End: 2021-01-18

## 2019-11-18 RX ORDER — PREDNISONE 20 MG/1
10 TABLET ORAL 3 TIMES DAILY
COMMUNITY
End: 2021-01-18

## 2019-11-18 RX ORDER — FAMOTIDINE 20 MG/1
20 TABLET, FILM COATED ORAL 2 TIMES DAILY
COMMUNITY
End: 2021-01-18

## 2019-12-29 DIAGNOSIS — E29.1 HYPOGONADISM MALE: ICD-10-CM

## 2019-12-30 DIAGNOSIS — E29.1 HYPOGONADISM MALE: ICD-10-CM

## 2019-12-31 RX ORDER — TESTOSTERONE 10 MG/.5G
GEL, METERED TOPICAL
Qty: 3 BOTTLE | Refills: 1 | Status: SHIPPED | OUTPATIENT
Start: 2019-12-31 | End: 2020-08-05 | Stop reason: SDUPTHER

## 2019-12-31 RX ORDER — TESTOSTERONE 10 MG/.5G
GEL, METERED TOPICAL
Qty: 3 BOTTLE | Refills: 1 | Status: SHIPPED | OUTPATIENT
Start: 2019-12-31 | End: 2019-12-31 | Stop reason: SDUPTHER

## 2020-01-10 LAB
HGB BLD-MCNC: 15.1 G/DL (ref 13–17.7)
PSA SERPL-MCNC: 0.5 NG/ML (ref 0–4)
SPECIMEN STATUS REPORT, ROLRST: NORMAL
TESTOST SERPL-MCNC: 417 NG/DL (ref 264–916)

## 2020-01-20 ENCOUNTER — OFFICE VISIT (OUTPATIENT)
Dept: ENDOCRINOLOGY | Age: 60
End: 2020-01-20

## 2020-01-20 VITALS
HEART RATE: 89 BPM | DIASTOLIC BLOOD PRESSURE: 86 MMHG | BODY MASS INDEX: 31.58 KG/M2 | SYSTOLIC BLOOD PRESSURE: 135 MMHG | WEIGHT: 213.2 LBS | HEIGHT: 69 IN

## 2020-01-20 DIAGNOSIS — E29.1 HYPOGONADISM MALE: Primary | ICD-10-CM

## 2020-01-20 RX ORDER — VALSARTAN 160 MG/1
160 TABLET ORAL DAILY
COMMUNITY
Start: 2019-11-03

## 2020-01-20 NOTE — PATIENT INSTRUCTIONS
1) Your testosterone remains at goal between 400-800 so even though your level is lower than last year, this may have been from the steroids you are on.  
 
2) Your PSA (prostate cancer blood test) is normal and your hemoglobin (red blood cell count) is normal.

## 2020-01-20 NOTE — PROGRESS NOTES
Chief Complaint   Patient presents with    Hypogonadism     pcp and pharmacy confirmed     History of Present Illness: Katarzyna Vargas is a 63 y.o. male here for follow up of hypogonadism. Weight down 10 lbs since last visit in 1/19. Starting in May 2019 developed more ankle and feet swelling and then went to his PCP and was referred to Dr. 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 Was also started on simvastatin for cholesterol. About 3 weeks ago had a f/u visit and his prednisone was decreased to 10 mg tid. All of the swelling has resolved on this regimen. Aside from this, no major change to health since last visit. Still applying 1 pump of testosterone per day and had labs drawn about 3-4 hours after applying the gel. Still feeling well on the current dose. No rash at application site. No trouble passing urine but may a little bit of dribbling but unchanged. Current Outpatient Medications   Medication Sig    valsartan (DIOVAN) 160 mg tablet Take 160 mg by mouth daily.  testosterone (FORTESTA) 10 mg/0.5 gram /actuation glpm Apply 1 pump daily or as directed up to 2 pumps daily    predniSONE (DELTASONE) 20 mg tablet Take 10 mg by mouth three (3) times daily.  metFORMIN (GLUCOPHAGE) 1,000 mg tablet Take 1,000 mg by mouth two (2) times daily (with meals).  famotidine (PEPCID) 20 mg tablet Take 20 mg by mouth two (2) times a day.  acetaminophen (TYLENOL) 325 mg tablet Take 650 mg by mouth every four (4) hours as needed for Pain.  vitamin e (E GEMS) 100 unit capsule Take  by mouth daily.  flaxseed oil 1,000 mg cap Take  by mouth.  magnesium oxide (MAG-OX) 400 mg tablet Take 400 mg by mouth daily.  simvastatin (ZOCOR) 10 mg tablet Take 10 mg by mouth nightly.  bumetanide (BUMEX) 1 mg tablet Take 1 mg by mouth daily.     ZETIA 10 mg tablet Take 10 mg by mouth daily.  aspirin 81 mg chewable tablet Take 81 mg by mouth daily.  zolpidem (AMBIEN) 5 mg tablet Take  by mouth nightly as needed for Sleep. No current facility-administered medications for this visit. Allergies   Allergen Reactions    Versed [Midazolam] Other (comments)     /reasction : panic attack    Crestor [Rosuvastatin] Other (comments)     Headache      Olmesartan Other (comments)     Headaches      Plavix [Clopidogrel] Rash    Pravastatin Other (comments)     Headaches and myalgias    Sulfa (Sulfonamide Antibiotics) Hives     Review of Systems:  - Cardiovascular: no chest pain  - Neurological: no tremors  - Integumentary: skin is normal    Physical Examination:  Blood pressure 135/86, pulse 89, height 5' 9\" (1.753 m), weight 213 lb 3.2 oz (96.7 kg). - General: pleasant, no distress, good eye contact   - Neck: no carotid bruits  - Cardiovascular: regular, normal rate, nl s1 and s2, no m/r/g   - Respiratory: clear to auscultation bilaterally   - Integumentary: skin is normal, trace edema  - Neurological: reflexes 2+ at biceps, no tremors  - Psychiatric: normal mood and affect    Data Reviewed:   Component      Latest Ref Rng & Units 1/9/2020 1/9/2020 1/9/2020          12:00 AM 12:00 AM 12:00 AM   Testosterone      264 - 916 ng/dL   417   HGB      13.0 - 17.7 g/dL  15.1    Prostate Specific Ag      0.0 - 4.0 ng/mL 0.5       Assessment/Plan:     1. Hypogonadism male: Around age 56, was having fatigue, irritability, low libido and ED. Saw a urologist, Dr. Charlie Lindsey, in [facility] and was recalls his testosterone level was 212 and doesn't recall being evaluated for the cause of the low testosterone. 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   His wife was taught how to give injections and had done this the entire time. Injected testosterone cypionate 200 mg/ml 2 ml = 400 mg every 2 weeks in the upper buttock area and rotates sides. Had been on this dose almost since the beginning. In the summer of 2013, he stopped testosterone for about a month and his level was around 30 off injection. In 8/14 his level was 1046 about 8 days after the injection and his Hgb was slightly high at 17.2. He started donating blood every 56 days prior to being on testosterone and had continued doing this while on testosterone. Repeat level was 1410 5 days after an injection in 8/14 and Hgb was 16.7 so I decreased his dose to 300 mg IM every 2 weeks and has remained on this since then. His prolactin level was normal in 8/14 and his ferritin was slightly low, not high, so he doesn't have hemochromatosis. Given his CAD, I don't want him on too much of a dose and don't want his Hgb too high to risk a hyperviscous state. His testosterone level was 879 and Hgb 15.8 on 1/5/15. He wanted to change to gel for more consistent levels as he does seem to have a peak and trough effect and these are less likely to raise Hgb level and we changed to fortesta 4 pumps daily at the end of April 2015 and after 3 weeks of application his level was 351 but this was without applying gel the morning of his lab draw. Hgb was 17.7 but was due for blood donation. Repeat level in 9/15 was > 1500 so decreased to 3 pumps per day and level was 748 in 12/15 and 938 in 12/16 so kept his dose the same. T up to 1012 in 12/17 so tapered to 2 pumps 3x/week and 3 pumps 4x/week for 1 month and then down to 2 pumps daily for the next month and then repeat labs in 3/18 showed T level was still high at 949 so decreased to 1 pump daily and T level low at 142 in 5/18 but didn't apply the gel that morning and repeat 2 weeks later was normal at 765 and still normal at 703 in 1/19 and 417 in 1/20 so kept dose the same.   - cont fortesta 2% to 1 pump daily  - check total testosterone and Hgb and PSA prior to next visit   - SOFI done 12/16  - Hgb 16.2 in 12/15, 15.7 in 12/16 and 14.8 in 12/17 and 14.9 in 1/19 and 15.1 in 1/20  - PSA 0.6 in 12/15, 0.7 in 12/16 and 0.6 in 12/17 and 1.0 in 1/19 and 0.5 in 1/20     Patient Instructions   1) Your testosterone remains at goal between 400-800 so even though your level is lower than last year, this may have been from the steroids you are on. 2) Your PSA (prostate cancer blood test) is normal and your hemoglobin (red blood cell count) is normal.      Follow-up and Dispositions    · Return in about 1 year (around 1/20/2021).                Copy sent to:  Mala Mcnamara MD as PCP - General (Family Practice)  Eleuterio Galaviz MD (Cardiology)  Sayda Lee DO (Nephrology)

## 2020-03-25 PROBLEM — N04.9 NEPHROTIC SYNDROME: Status: ACTIVE | Noted: 2020-03-25

## 2020-03-26 NOTE — PROGRESS NOTES
Pharmacy Note     Name: Que Bhakta  : 1960  Estimated body surface area is 2.17 meters squared as calculated from the following:    Height as of 20: 175.3 cm (69\"). Weight as of 20: 96.7 kg (213 lb 3.2 oz). Diagnosis: Nephrotic Syndrome  Treatment Plan: Rituxan (Rituximab) 1000 mg IV (Flat-Dosing)  Cycle Start Date: 2020    Lab Results   Component Value Date/Time    WBC 6.5 10/28/2019 09:15 AM    PLATELET 208  09:15 AM     Lab Results   Component Value Date/Time    ABS.  NEUTROPHILS 4.5 10/28/2019 09:15 AM     Lab Results   Component Value Date/Time    Creatinine 0.89 10/28/2019 09:15 AM     Most Recent Creatinine Clearance:  CrCl: 0.0 mL/min (based on Adjusted Body Weight)  Creatinine: 0.89 mg/dL (10/28/2019)      Pharmacy Intervention:  Recommended to move Cycle 1, Day 14 to Cycle 1, Day 15 of Rituxan therapy for Nephrotic Syndrome   Per GEO [place] Alert, she confirmed with Dr. Florencia Lam to move Cycle 1, Day 14 to Cycle 1, Day 15     Pharmacist: Quan Farmer, [place]

## 2020-03-27 RX ORDER — HYDROCORTISONE SODIUM SUCCINATE 100 MG/2ML
100 INJECTION, POWDER, FOR SOLUTION INTRAMUSCULAR; INTRAVENOUS AS NEEDED
Status: CANCELLED | OUTPATIENT
Start: 2020-04-20

## 2020-03-27 RX ORDER — HYDROCORTISONE SODIUM SUCCINATE 100 MG/2ML
100 INJECTION, POWDER, FOR SOLUTION INTRAMUSCULAR; INTRAVENOUS AS NEEDED
Status: CANCELLED | OUTPATIENT
Start: 2020-05-04

## 2020-03-27 RX ORDER — DIPHENHYDRAMINE HYDROCHLORIDE 50 MG/ML
25 INJECTION, SOLUTION INTRAMUSCULAR; INTRAVENOUS AS NEEDED
Status: CANCELLED
Start: 2020-04-20

## 2020-03-27 RX ORDER — HEPARIN 100 UNIT/ML
300-500 SYRINGE INTRAVENOUS AS NEEDED
Status: CANCELLED
Start: 2020-05-04

## 2020-03-27 RX ORDER — ACETAMINOPHEN 325 MG/1
650 TABLET ORAL AS NEEDED
Status: CANCELLED
Start: 2020-05-04

## 2020-03-27 RX ORDER — DIPHENHYDRAMINE HYDROCHLORIDE 50 MG/ML
50 INJECTION, SOLUTION INTRAMUSCULAR; INTRAVENOUS AS NEEDED
Status: CANCELLED
Start: 2020-05-04

## 2020-03-27 RX ORDER — DIPHENHYDRAMINE HYDROCHLORIDE 50 MG/ML
50 INJECTION, SOLUTION INTRAMUSCULAR; INTRAVENOUS ONCE
Status: CANCELLED
Start: 2020-04-20

## 2020-03-27 RX ORDER — ACETAMINOPHEN 325 MG/1
650 TABLET ORAL ONCE
Status: CANCELLED
Start: 2020-04-20

## 2020-03-27 RX ORDER — ALBUTEROL SULFATE 0.83 MG/ML
2.5 SOLUTION RESPIRATORY (INHALATION) AS NEEDED
Status: CANCELLED
Start: 2020-04-20

## 2020-03-27 RX ORDER — SODIUM CHLORIDE 0.9 % (FLUSH) 0.9 %
10 SYRINGE (ML) INJECTION AS NEEDED
Status: CANCELLED
Start: 2020-04-20

## 2020-03-27 RX ORDER — DIPHENHYDRAMINE HYDROCHLORIDE 50 MG/ML
50 INJECTION, SOLUTION INTRAMUSCULAR; INTRAVENOUS AS NEEDED
Status: CANCELLED
Start: 2020-04-20

## 2020-03-27 RX ORDER — ACETAMINOPHEN 325 MG/1
650 TABLET ORAL AS NEEDED
Status: CANCELLED
Start: 2020-04-20

## 2020-03-27 RX ORDER — DIPHENHYDRAMINE HYDROCHLORIDE 50 MG/ML
25 INJECTION, SOLUTION INTRAMUSCULAR; INTRAVENOUS AS NEEDED
Status: CANCELLED
Start: 2020-05-04

## 2020-03-27 RX ORDER — DIPHENHYDRAMINE HYDROCHLORIDE 50 MG/ML
50 INJECTION, SOLUTION INTRAMUSCULAR; INTRAVENOUS ONCE
Status: CANCELLED
Start: 2020-05-04

## 2020-03-27 RX ORDER — SODIUM CHLORIDE 9 MG/ML
25 INJECTION, SOLUTION INTRAVENOUS CONTINUOUS
Status: CANCELLED | OUTPATIENT
Start: 2020-04-20

## 2020-03-27 RX ORDER — HEPARIN 100 UNIT/ML
300-500 SYRINGE INTRAVENOUS AS NEEDED
Status: CANCELLED
Start: 2020-04-20

## 2020-03-27 RX ORDER — SODIUM CHLORIDE 9 MG/ML
10 INJECTION INTRAMUSCULAR; INTRAVENOUS; SUBCUTANEOUS AS NEEDED
Status: CANCELLED | OUTPATIENT
Start: 2020-04-20

## 2020-03-27 RX ORDER — EPINEPHRINE 1 MG/ML
0.3 INJECTION, SOLUTION, CONCENTRATE INTRAVENOUS AS NEEDED
Status: CANCELLED | OUTPATIENT
Start: 2020-05-04

## 2020-03-27 RX ORDER — ONDANSETRON 2 MG/ML
8 INJECTION INTRAMUSCULAR; INTRAVENOUS AS NEEDED
Status: CANCELLED | OUTPATIENT
Start: 2020-04-20

## 2020-03-27 RX ORDER — SODIUM CHLORIDE 9 MG/ML
10 INJECTION INTRAMUSCULAR; INTRAVENOUS; SUBCUTANEOUS AS NEEDED
Status: CANCELLED | OUTPATIENT
Start: 2020-05-04

## 2020-03-27 RX ORDER — EPINEPHRINE 1 MG/ML
0.3 INJECTION, SOLUTION, CONCENTRATE INTRAVENOUS AS NEEDED
Status: CANCELLED | OUTPATIENT
Start: 2020-04-20

## 2020-03-27 RX ORDER — ACETAMINOPHEN 325 MG/1
650 TABLET ORAL ONCE
Status: CANCELLED
Start: 2020-05-04

## 2020-03-27 RX ORDER — SODIUM CHLORIDE 9 MG/ML
25 INJECTION, SOLUTION INTRAVENOUS CONTINUOUS
Status: CANCELLED | OUTPATIENT
Start: 2020-05-04

## 2020-03-27 RX ORDER — ALBUTEROL SULFATE 0.83 MG/ML
2.5 SOLUTION RESPIRATORY (INHALATION) AS NEEDED
Status: CANCELLED
Start: 2020-05-04

## 2020-03-27 RX ORDER — ONDANSETRON 2 MG/ML
8 INJECTION INTRAMUSCULAR; INTRAVENOUS AS NEEDED
Status: CANCELLED | OUTPATIENT
Start: 2020-05-04

## 2020-03-27 RX ORDER — SODIUM CHLORIDE 0.9 % (FLUSH) 0.9 %
10 SYRINGE (ML) INJECTION AS NEEDED
Status: CANCELLED
Start: 2020-05-04

## 2020-04-06 ENCOUNTER — HOSPITAL ENCOUNTER (OUTPATIENT)
Dept: INFUSION THERAPY | Age: 60
Discharge: HOME | End: 2020-04-06

## 2020-04-20 ENCOUNTER — HOSPITAL ENCOUNTER (OUTPATIENT)
Dept: INFUSION THERAPY | Age: 60
End: 2020-04-20

## 2020-11-18 DIAGNOSIS — E29.1 HYPOGONADISM MALE: ICD-10-CM

## 2020-11-18 RX ORDER — TESTOSTERONE 10 MG/.5G
GEL, METERED TOPICAL
Qty: 3 BOTTLE | Refills: 1 | Status: SHIPPED | OUTPATIENT
Start: 2020-11-18 | End: 2021-12-03 | Stop reason: SDUPTHER

## 2021-01-06 DIAGNOSIS — E29.1 HYPOGONADISM MALE: ICD-10-CM

## 2021-01-18 ENCOUNTER — VIRTUAL VISIT (OUTPATIENT)
Dept: ENDOCRINOLOGY | Age: 61
End: 2021-01-18
Payer: COMMERCIAL

## 2021-01-18 DIAGNOSIS — E29.1 HYPOGONADISM MALE: Primary | ICD-10-CM

## 2021-01-18 PROCEDURE — 99442 PR PHYS/QHP TELEPHONE EVALUATION 11-20 MIN: CPT | Performed by: INTERNAL MEDICINE

## 2021-01-18 RX ORDER — TACROLIMUS 1 MG/1
CAPSULE ORAL
COMMUNITY
Start: 2020-06-25

## 2021-01-18 RX ORDER — CARVEDILOL 3.12 MG/1
3.12 TABLET ORAL 2 TIMES DAILY
COMMUNITY
Start: 2020-12-29 | End: 2022-01-17

## 2021-01-18 NOTE — PATIENT INSTRUCTIONS
1) Your testosterone was 579 which is at goal. 
 
2) Your hemoglobin was a little low at 12.5 (13.1-17.2) so be sure to follow up with your PCP and kidney doctor to make sure no further evaluation is needed for anemia. 3) Your PSA (prostate cancer blood test) was normal at 0.8. 4) I will mail you a lab slip. Please use this to have your labs drawn in the 1-2 weeks prior to your next visit. 5) Please come for a follow up visit on 1/17/22 at 1:30pm in our [place] office.

## 2021-01-18 NOTE — PROGRESS NOTES
Chief Complaint   Patient presents with    Hypogonadism     phone call [phone]    Other     pcp -Sandra Webb and pharmacy confirmed       **THIS IS A VIRTUAL VISIT VIA A TELEPHONE ENCOUNTER. PATIENT AGREED TO HAVE THEIR CARE DELIVERED OVER THE PHONE IN PLACE OF THEIR REGULARLY SCHEDULED OFFICE VISIT**    History of Present Illness: Libertad Redmond is a 62 y.o. male here for follow up of hypogonadism. After last visit was able to come off the steroid sometime in the summer of 2020 and was put on prograf instead and was able to also come off metformin. Had coreg added for his blood pressure. He had umbilical hernia surgery in 8/20. Also he and his wife tested positive for COVID on 12/26/20 and he also tested positive for flu. He has been out of work for 3 weeks just to be safe. Has not had any blood in the stool or dark black stools but unclear if the drop in Hgb could be from his kidney disease. Had a normal stress prior to the hernia surgery. Still apply 1 pump of testosterone per day and went to the lab about 4 hours after application. Overall energy is doing well aside from when he had fatigue with COVID. No hesitancy or urgency. Has nocturia x2 which has improved from 4 times a night. Weight was 213 in 1/20 and currently is down to 204. Current Outpatient Medications   Medication Sig    tacrolimus (Prograf) 1 mg capsule Take 2 Tabs by mouth two (2) times a day.  carvediloL (COREG) 3.125 mg tablet Take 3.125 mg by mouth two (2) times a day.  testosterone Purnima Chock) 10 mg/0.5 gram /actuation glpm Apply 1 pump daily or as directed up to 2 pumps daily    valsartan (DIOVAN) 160 mg tablet Take 160 mg by mouth daily.  acetaminophen (TYLENOL) 325 mg tablet Take 650 mg by mouth every four (4) hours as needed for Pain.  vitamin e (E GEMS) 100 unit capsule Take  by mouth daily.  simvastatin (ZOCOR) 10 mg tablet Take 10 mg by mouth nightly.     bumetanide (BUMEX) 1 mg tablet Take 1 mg by mouth daily. 1-2 tabs daily    ZETIA 10 mg tablet Take 10 mg by mouth daily.  aspirin 81 mg chewable tablet Take 81 mg by mouth daily.  zolpidem (AMBIEN) 5 mg tablet Take  by mouth nightly as needed for Sleep. No current facility-administered medications for this visit. Allergies   Allergen Reactions    Versed [Midazolam] Other (comments)     /reasction : panic attack    Crestor [Rosuvastatin] Other (comments)     Headache      Olmesartan Other (comments)     Headaches      Plavix [Clopidogrel] Rash    Pravastatin Other (comments)     Headaches and myalgias    Sulfa (Sulfonamide Antibiotics) Hives     Review of Systems: PER HPI    Physical Examination: NOT PERFORMED DUE TO THIS BEING A TELEPHONE CALL      Data Reviewed: 1/7/21  - Testosterone 579  - Hgb 12.5  - PSA 0.82    Assessment/Plan:     1. Hypogonadism male: Around age 56, was having fatigue, irritability, low libido and ED. Saw a urologist, Dr. Konstantin Keller, in [place] and was recalls his testosterone level was 212 and doesn't recall being evaluated for the cause of the low testosterone. 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 His wife was taught how to give injections and had done this the entire time. Injected testosterone cypionate 200 mg/ml 2 ml = 400 mg every 2 weeks in the upper buttock area and rotates sides. Had been on this dose almost since the beginning. In the summer of 2013, he stopped testosterone for about a month and his level was around 30 off injection. In 8/14 his level was 1046 about 8 days after the injection and his Hgb was slightly high at 17.2. He started donating blood every 56 days prior to being on testosterone and had continued doing this while on testosterone.   Repeat level was 1410 5 days after an injection in 8/14 and Hgb was 16.7 so I decreased his dose to 300 mg IM every 2 weeks and has remained on this since then. His prolactin level was normal in 8/14 and his ferritin was slightly low, not high, so he doesn't have hemochromatosis. Given his CAD, I don't want him on too much of a dose and don't want his Hgb too high to risk a hyperviscous state. His testosterone level was 879 and Hgb 15.8 on 1/5/15. He wanted to change to gel for more consistent levels as he does seem to have a peak and trough effect and these are less likely to raise Hgb level and we changed to fortesta 4 pumps daily at the end of April 2015 and after 3 weeks of application his level was 351 but this was without applying gel the morning of his lab draw. Hgb was 17.7 but was due for blood donation. Repeat level in 9/15 was > 1500 so decreased to 3 pumps per day and level was 748 in 12/15 and 938 in 12/16 so kept his dose the same. T up to 1012 in 12/17 so tapered to 2 pumps 3x/week and 3 pumps 4x/week for 1 month and then down to 2 pumps daily for the next month and then repeat labs in 3/18 showed T level was still high at 949 so decreased to 1 pump daily and T level low at 142 in 5/18 but didn't apply the gel that morning and repeat 2 weeks later was normal at 765 and still normal at 703 in 1/19 and 417 in 1/20 and 579 in 1/21 so kept dose the same. - cont fortesta 2% to 1 pump daily  - check total testosterone and Hgb and PSA prior to next visit   - SOFI done 12/16  - Hgb 16.2 in 12/15, 15.7 in 12/16 and 14.8 in 12/17 and 14.9 in 1/19 and 15.1 in 1/20 and 12.5 in 1/21  - PSA 0.6 in 12/15, 0.7 in 12/16 and 0.6 in 12/17 and 1.0 in 1/19 and 0.5 in 1/20 and 0.8 in 1/21     Patient Instructions   1) Your testosterone was 579 which is at goal.    2) Your hemoglobin was a little low at 12.5 (13.1-17.2) so be sure to follow up with your PCP and kidney doctor to make sure no further evaluation is needed for anemia.     3) Your PSA (prostate cancer blood test) was normal at 0.8.    4) I will mail you a lab slip. Please use this to have your labs drawn in the 1-2 weeks prior to your next visit. 5) Please come for a follow up visit on 1/17/22 at 1:30pm in our [place] office. Follow-up and Dispositions    · Return 1/17/22 at 1:30pm.         I spent a total of 18 minutes on the day of this virtual visit with a telephone encounter. All questions were answered and a copy of the instructions were sent to him via .       Copy sent to:  Delfina Hanks MD as PCP - General (Family Practice)  Neeraj Schultz MD (Cardiology)  Vinita Clancy DO (Nephrology)

## 2021-12-03 DIAGNOSIS — E29.1 HYPOGONADISM MALE: ICD-10-CM

## 2021-12-03 NOTE — TELEPHONE ENCOUNTER
----- Message from Lidia Scott sent at 12/3/2021 11:15 AM EST -----  Regarding: Testosterone  I missed the refill deadline on my testosterone, i still have some but would like for you to send a refill before the end of the December. I have met my deductible and its alot cheaper now than in the new year. Thanks you Lidia Scott.

## 2021-12-06 RX ORDER — TESTOSTERONE 10 MG/.5G
GEL, METERED TOPICAL
Qty: 3 EACH | Refills: 1 | Status: SHIPPED | OUTPATIENT
Start: 2021-12-06 | End: 2022-07-27 | Stop reason: SDUPTHER

## 2021-12-09 ENCOUNTER — TELEPHONE (OUTPATIENT)
Dept: ENDOCRINOLOGY | Age: 61
End: 2021-12-09

## 2021-12-15 NOTE — TELEPHONE ENCOUNTER
Sent him the following message through [employer]:    I submitted an authorization for the testosterone via fax and received notification that this med has been approved so you should be able to get this from the mail pharmacy that normally dispenses this for you. Please let me know if you have any trouble getting this filled.

## 2022-01-03 DIAGNOSIS — E29.1 HYPOGONADISM MALE: ICD-10-CM

## 2022-01-08 LAB
HGB BLD-MCNC: 15.7 G/DL (ref 13–17.7)
PSA SERPL-MCNC: 0.8 NG/ML (ref 0–4)
TESTOST SERPL-MCNC: 702 NG/DL (ref 264–916)

## 2022-01-17 ENCOUNTER — VIRTUAL VISIT (OUTPATIENT)
Dept: ENDOCRINOLOGY | Age: 62
End: 2022-01-17
Payer: COMMERCIAL

## 2022-01-17 DIAGNOSIS — E29.1 HYPOGONADISM MALE: Primary | ICD-10-CM

## 2022-01-17 PROCEDURE — 99442 PR PHYS/QHP TELEPHONE EVALUATION 11-20 MIN: CPT | Performed by: INTERNAL MEDICINE

## 2022-01-17 RX ORDER — BUMETANIDE 1 MG/1
0.5 TABLET ORAL AS NEEDED
COMMUNITY
Start: 2022-01-17

## 2022-01-17 RX ORDER — PRAVASTATIN SODIUM 20 MG/1
20 TABLET ORAL
COMMUNITY

## 2022-01-17 NOTE — PROGRESS NOTES
Chief Complaint   Patient presents with    Hypogonadism     phone call only: [phone] Other     pcp and pharmacy confirmed       **THIS IS A VIRTUAL VISIT VIA A TELEPHONE ENCOUNTER. PATIENT AGREED TO HAVE THEIR CARE DELIVERED OVER THE PHONE IN PLACE OF THEIR REGULARLY SCHEDULED OFFICE VISIT**    History of Present Illness: Ariadna Mckinley is a 62 y.o. male here for follow up of hypogonadism. Just had a temporary crown put on today. No major change to health since last visit in 1/22. His kidney function has been doing better and nephrotic syndrome has improved and his BP was better so he was taken off the coreg and just on valsartan for blood pressure. Still applying one pump per day of testosterone. Weight was 204 last year and currently is around 205-207 lbs. Overall energy is still pretty good. Does get up to urinate every 2-3 hours but this has been better as his kidney function has improved and depends on how much water he drinks. No hesitancy or urgency. Current Outpatient Medications   Medication Sig    pravastatin (PRAVACHOL) 20 mg tablet Take 20 mg by mouth nightly.  bumetanide (BUMEX) 1 mg tablet Take 0.5 Tablets by mouth as needed.  testosterone Jerline Hargill) 10 mg/0.5 gram /actuation glpm Apply 1 pump daily or as directed up to 2 pumps daily    tacrolimus (Prograf) 1 mg capsule 1 tablet at noon and 2 tab at midnight    valsartan (DIOVAN) 160 mg tablet Take 160 mg by mouth daily.  vitamin e (E GEMS) 100 unit capsule Take  by mouth daily.  aspirin 81 mg chewable tablet Take 81 mg by mouth daily.  zolpidem (AMBIEN) 5 mg tablet Take  by mouth nightly as needed for Sleep. No current facility-administered medications for this visit.      Allergies   Allergen Reactions    Versed [Midazolam] Other (comments)     /reasction : panic attack    Crestor [Rosuvastatin] Other (comments)     Headache      Olmesartan Other (comments)     Headaches      Plavix [Clopidogrel] Rash  Pravastatin Other (comments)     Headaches and myalgias    Sulfa (Sulfonamide Antibiotics) Hives     Review of Systems: PER HPI    Physical Examination: NOT PERFORMED DUE TO THIS BEING A TELEPHONE CALL      Data Reviewed:   Component      Latest Ref Rng & Units 1/7/2022 1/7/2022 1/7/2022          11:31 AM 11:31 AM 11:31 AM   Testosterone      264 - 916 ng/dL   702   HGB      13.0 - 17.7 g/dL  15.7    Prostate Specific Ag      0.0 - 4.0 ng/mL 0.8         Assessment/Plan:     1. Hypogonadism male: Around age 56, was having fatigue, irritability, low libido and ED. Saw a urologist, Dr. Nino Tamayo, in [facility] and was recalls his testosterone level was 212 and doesn't recall being evaluated for the cause of the low testosterone. 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 His wife was taught how to give injections and had done this the entire time. Injected testosterone cypionate 200 mg/ml 2 ml = 400 mg every 2 weeks in the upper buttock area and rotates sides. Had been on this dose almost since the beginning. In the summer of 2013, he stopped testosterone for about a month and his level was around 30 off injection. In 8/14 his level was 1046 about 8 days after the injection and his Hgb was slightly high at 17.2. He started donating blood every 56 days prior to being on testosterone and had continued doing this while on testosterone. Repeat level was 1410 5 days after an injection in 8/14 and Hgb was 16.7 so I decreased his dose to 300 mg IM every 2 weeks and has remained on this since then. His prolactin level was normal in 8/14 and his ferritin was slightly low, not high, so he doesn't have hemochromatosis. Given his CAD, I don't want him on too much of a dose and don't want his Hgb too high to risk a hyperviscous state.   His testosterone level was 879 and Hgb 15.8 on 1/5/15. He wanted to change to gel for more consistent levels as he does seem to have a peak and trough effect and these are less likely to raise Hgb level and we changed to fortesta 4 pumps daily at the end of April 2015 and after 3 weeks of application his level was 351 but this was without applying gel the morning of his lab draw. Hgb was 17.7 but was due for blood donation. Repeat level in 9/15 was > 1500 so decreased to 3 pumps per day and level was 748 in 12/15 and 938 in 12/16 so kept his dose the same. T up to 1012 in 12/17 so tapered to 2 pumps 3x/week and 3 pumps 4x/week for 1 month and then down to 2 pumps daily for the next month and then repeat labs in 3/18 showed T level was still high at 949 so decreased to 1 pump daily and T level low at 142 in 5/18 but didn't apply the gel that morning and repeat 2 weeks later was normal at 765 and still normal at 703 in 1/19 and 417 in 1/20 and 579 in 1/21 and 702 in 1/22 so kept dose the same. - cont fortesta 2% to 1 pump daily  - check total testosterone and Hgb and PSA prior to next visit   - SOFI done 12/16  - Hgb 16.2 in 12/15, 15.7 in 12/16 and 14.8 in 12/17 and 14.9 in 1/19 and 15.1 in 1/20 and 12.5 in 1/21 and 15.7 in 1/22  - PSA 0.6 in 12/15, 0.7 in 12/16 and 0.6 in 12/17 and 1.0 in 1/19 and 0.5 in 1/20 and 0.8 in 1/21 and in 1/22         Follow-up and Dispositions    · Return 1/16/23 at 1:30pm.  Follow-up and Disposition History             I spent a total of 17 minutes on the day of this virtual visit with a telephone encounter. All questions were answered.       Copy sent to:  TANA Hassan MD (Cardiology)  Sandra Mena DO (Nephrology)

## 2022-03-19 PROBLEM — N04.9 NEPHROTIC SYNDROME: Status: ACTIVE | Noted: 2020-03-25

## 2022-04-15 ENCOUNTER — HOSPITAL ENCOUNTER (OUTPATIENT)
Dept: INFUSION THERAPY | Age: 62
End: 2022-04-15

## 2022-07-22 ENCOUNTER — HOSPITAL ENCOUNTER (OUTPATIENT)
Dept: INFUSION THERAPY | Age: 62
Discharge: HOME | End: 2022-07-22

## 2022-07-27 DIAGNOSIS — E29.1 HYPOGONADISM MALE: ICD-10-CM

## 2022-07-27 RX ORDER — TESTOSTERONE 10 MG/.5G
GEL, METERED TOPICAL
Qty: 3 EACH | Refills: 0 | Status: SHIPPED | OUTPATIENT
Start: 2022-07-27

## 2022-07-27 NOTE — TELEPHONE ENCOUNTER
90 day refill sent in per Dr. Evonne Downey last office note in his absence.     Erick Kruger, [facility]

## 2022-07-29 ENCOUNTER — APPOINTMENT (OUTPATIENT)
Dept: INFUSION THERAPY | Age: 62
End: 2022-07-29

## 2022-12-20 LAB
HGB BLD-MCNC: 13.2 G/DL (ref 13–17.7)
PSA SERPL-MCNC: 1.2 NG/ML (ref 0–4)
TESTOST SERPL-MCNC: 365 NG/DL (ref 264–916)

## 2023-01-02 DIAGNOSIS — E29.1 HYPOGONADISM MALE: ICD-10-CM

## 2023-01-16 ENCOUNTER — OFFICE VISIT (OUTPATIENT)
Dept: ENDOCRINOLOGY | Age: 63
End: 2023-01-16
Payer: COMMERCIAL

## 2023-01-16 VITALS
WEIGHT: 220.6 LBS | DIASTOLIC BLOOD PRESSURE: 84 MMHG | HEIGHT: 69 IN | SYSTOLIC BLOOD PRESSURE: 128 MMHG | HEART RATE: 85 BPM | BODY MASS INDEX: 32.67 KG/M2

## 2023-01-16 DIAGNOSIS — E29.1 HYPOGONADISM MALE: Primary | ICD-10-CM

## 2023-01-16 PROCEDURE — 99214 OFFICE O/P EST MOD 30 MIN: CPT | Performed by: INTERNAL MEDICINE

## 2023-01-16 RX ORDER — TESTOSTERONE 10 MG/.5G
GEL, METERED TOPICAL
Qty: 3 EACH | Refills: 0
Start: 2023-01-16

## 2023-01-16 NOTE — PROGRESS NOTES
Chief Complaint   Patient presents with    Hypogonadism     PCP and pharmacy confirmed      History of Present Illness: Lisa Spaulding is a 65 y.o. male here for follow up of hypogonadism. Weight up from 205-207 since last visit in 1/22 to 220 lbs today. No major change to health since last visit aside from getting 2 Rituxin infusions this fall in 9/22 about 15 days apart. Due to see Dr. Janis Quiles today for his kidneys. Was on steroids again this fall for his nephrotic syndrome from 9/22 until 12/22 and took last dose before Christmas so his labs did reflect some prednisone in the system of about 2.5-5 mg every other day. Still getting 1 pump of testosterone per day and hasn't missed any doses and labs were drawn about 3-4 hours after applying the gel. Still gets up 2-3 times a night to urinate but no worse than last year. Very mild hesitancy but does have some urgency during the day but is on the bumex. Sex drive is lower than last year. Energy is not as good as it used to but this may be from work and interrupted sleep. Still has about 2.5 bottles left. Plans on retiring at the end of the year. Would like to try increasing his dose to see if he feels better. Current Outpatient Medications   Medication Sig    testosterone Majo Wallace) 10 mg/0.5 gram /actuation glpm Apply 1 pump daily    pravastatin (PRAVACHOL) 20 mg tablet Take 20 mg by mouth nightly. bumetanide (BUMEX) 1 mg tablet Take 0.5 Tablets by mouth as needed. tacrolimus (PROGRAF) 1 mg capsule 2 caps daily    valsartan (DIOVAN) 160 mg tablet Take 160 mg by mouth daily. vitamin e (E GEMS) 100 unit capsule Take  by mouth daily. aspirin 81 mg chewable tablet Take 81 mg by mouth daily. zolpidem (AMBIEN) 5 mg tablet Take  by mouth nightly as needed for Sleep. No current facility-administered medications for this visit.      Allergies   Allergen Reactions    Versed [Midazolam] Other (comments)     /reasction : panic attack    Coreg [Carvedilol] Other (comments)     Headaches and fatigue    Crestor [Rosuvastatin] Other (comments)     Headache      Olmesartan Other (comments)     Headaches      Plavix [Clopidogrel] Rash    Pravastatin Other (comments)     Headaches and myalgias    Sulfa (Sulfonamide Antibiotics) Hives     Review of Systems: PER HPI    Physical Examination:  Blood pressure 128/84, pulse 85, height 5' 9\" (1.753 m), weight 220 lb 9.6 oz (100.1 kg). General: pleasant, no distress, good eye contact   Neck: no carotid bruits  Cardiovascular: regular, normal rate, nl s1 and s2, no m/r/g   Respiratory: clear to auscultation bilaterally   Integumentary: skin is normal, 1+ pitting edema  Psychiatric: normal mood and affect    Data Reviewed:   Component      Latest Ref Rng & Units 12/19/2022 12/19/2022 12/19/2022           1:06 PM  1:06 PM  1:06 PM   Testosterone      264 - 916 ng/dL   365   Prostate Specific Ag      0.0 - 4.0 ng/mL  1.2    HGB      13.0 - 17.7 g/dL 13.2         Assessment/Plan:     1. Hypogonadism male: Around age 56, was having fatigue, irritability, low libido and ED. Saw a urologist, Dr. Jesica Barrientos, in [facility] and was recalls his testosterone level was 212 and doesn't recall being evaluated for the cause of the low testosterone. 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 His wife was taught how to give injections and had done this the entire time. Injected testosterone cypionate 200 mg/ml 2 ml = 400 mg every 2 weeks in the upper buttock area and rotates sides. Had been on this dose almost since the beginning. In the summer of 2013, he stopped testosterone for about a month and his level was around 30 off injection. In 8/14 his level was 1046 about 8 days after the injection and his Hgb was slightly high at 17.2.   He started donating blood every 56 days prior to being on testosterone and had continued doing this while on testosterone. Repeat level was 1410 5 days after an injection in 8/14 and Hgb was 16.7 so I decreased his dose to 300 mg IM every 2 weeks and has remained on this since then. His prolactin level was normal in 8/14 and his ferritin was slightly low, not high, so he doesn't have hemochromatosis. Given his CAD, I don't want him on too much of a dose and don't want his Hgb too high to risk a hyperviscous state. His testosterone level was 879 and Hgb 15.8 on 1/5/15. He wanted to change to gel for more consistent levels as he does seem to have a peak and trough effect and these are less likely to raise Hgb level and we changed to fortesta 4 pumps daily at the end of April 2015 and after 3 weeks of application his level was 351 but this was without applying gel the morning of his lab draw. Hgb was 17.7 but was due for blood donation. Repeat level in 9/15 was > 1500 so decreased to 3 pumps per day and level was 748 in 12/15 and 938 in 12/16 so kept his dose the same. T up to 1012 in 12/17 so tapered to 2 pumps 3x/week and 3 pumps 4x/week for 1 month and then down to 2 pumps daily for the next month and then repeat labs in 3/18 showed T level was still high at 949 so decreased to 1 pump daily and T level low at 142 in 5/18 but didn't apply the gel that morning and repeat 2 weeks later was normal at 765 and still normal at 703 in 1/19 and 417 in 1/20 and 579 in 1/21 and 702 in 1/22 so kept dose the same. T down to 365 in 12/22 after weight was higher while receiving steroids for 3 months so increased back to 2 pumps daily.   - increase fortesta 2% to 2 pumps daily  - check total testosterone and Hgb and PSA in 3/23 and prior to next visit   - SOFI done 12/16  - Hgb 16.2 in 12/15, 15.7 in 12/16 and 14.8 in 12/17 and 14.9 in 1/19 and 15.1 in 1/20 and 12.5 in 1/21 and 15.7 in 1/22 and 13.2 in 12/22  - PSA 0.6 in 12/15, 0.7 in 12/16 and 0.6 in 12/17 and 1.0 in 1/19 and 0.5 in 1/20 and 0.8 in 1/21 and in 1/22 and 1.2 in 12/22     Patient Instructions   1) Your testosterone was normal at 365 but lower than it had been possibly due to weight gain and pituitary suppression while on steroids this fall. 2) For now, increase your dose to 2 pumps every morning until the end of February and then repeat your labs and we'll see how the dose looks in March. 3) Your PSA (prostate cancer blood test) was normal at 1.2 but slightly higher than last year and since we're increasing your dose, I will check this again in March. 4) Your hemoglobin (red blood cell count) was normal.      Follow-up and Dispositions    Return in about 1 year (around 1/16/2024).                Copy sent to:  TANA Dubon MD (Cardiology)  Alexus Abreu DO (Nephrology)

## 2023-01-16 NOTE — PATIENT INSTRUCTIONS
1) Your testosterone was normal at 365 but lower than it had been possibly due to weight gain and pituitary suppression while on steroids this fall. 2) For now, increase your dose to 2 pumps every morning until the end of February and then repeat your labs and we'll see how the dose looks in March. 3) Your PSA (prostate cancer blood test) was normal at 1.2 but slightly higher than last year and since we're increasing your dose, I will check this again in March.     4) Your hemoglobin (red blood cell count) was normal.

## 2023-03-01 DIAGNOSIS — E29.1 HYPOGONADISM MALE: ICD-10-CM

## 2023-04-04 RX ORDER — METHYLPREDNISOLONE SODIUM SUCCINATE 125 MG/2ML
125 INJECTION, POWDER, LYOPHILIZED, FOR SOLUTION INTRAMUSCULAR; INTRAVENOUS ONCE
Status: CANCELLED | OUTPATIENT
Start: 2023-04-13 | End: 2023-04-13

## 2023-04-04 RX ORDER — DIPHENHYDRAMINE HYDROCHLORIDE 50 MG/ML
50 INJECTION INTRAMUSCULAR; INTRAVENOUS ONCE
Status: CANCELLED | OUTPATIENT
Start: 2023-04-13 | End: 2023-04-13

## 2023-04-04 RX ORDER — DIPHENHYDRAMINE HYDROCHLORIDE 50 MG/ML
50 INJECTION INTRAMUSCULAR; INTRAVENOUS
OUTPATIENT
Start: 2023-04-13

## 2023-04-04 RX ORDER — MEPERIDINE HYDROCHLORIDE 25 MG/ML
12.5 INJECTION INTRAMUSCULAR; INTRAVENOUS; SUBCUTANEOUS PRN
OUTPATIENT
Start: 2023-04-13

## 2023-04-04 RX ORDER — HEPARIN SODIUM (PORCINE) LOCK FLUSH IV SOLN 100 UNIT/ML 100 UNIT/ML
500 SOLUTION INTRAVENOUS PRN
OUTPATIENT
Start: 2023-04-13

## 2023-04-04 RX ORDER — EPINEPHRINE 1 MG/ML
0.3 INJECTION, SOLUTION, CONCENTRATE INTRAVENOUS PRN
OUTPATIENT
Start: 2023-04-13

## 2023-04-04 RX ORDER — SODIUM CHLORIDE 9 MG/ML
5-250 INJECTION, SOLUTION INTRAVENOUS PRN
OUTPATIENT
Start: 2023-04-13

## 2023-04-04 RX ORDER — METHYLPREDNISOLONE SODIUM SUCCINATE 125 MG/2ML
125 INJECTION, POWDER, LYOPHILIZED, FOR SOLUTION INTRAMUSCULAR; INTRAVENOUS ONCE
OUTPATIENT
Start: 2023-04-13 | End: 2023-04-13

## 2023-04-04 RX ORDER — SODIUM CHLORIDE 0.9 % (FLUSH) 0.9 %
5-40 SYRINGE (ML) INJECTION PRN
OUTPATIENT
Start: 2023-04-13

## 2023-04-04 RX ORDER — DIPHENHYDRAMINE HYDROCHLORIDE 50 MG/ML
50 INJECTION INTRAMUSCULAR; INTRAVENOUS ONCE
OUTPATIENT
Start: 2023-04-13 | End: 2023-04-13

## 2023-04-04 RX ORDER — ACETAMINOPHEN 325 MG/1
650 TABLET ORAL ONCE
Status: CANCELLED | OUTPATIENT
Start: 2023-04-13 | End: 2023-04-13

## 2023-04-04 RX ORDER — ONDANSETRON 2 MG/ML
8 INJECTION INTRAMUSCULAR; INTRAVENOUS
OUTPATIENT
Start: 2023-04-13

## 2023-04-04 RX ORDER — ACETAMINOPHEN 325 MG/1
650 TABLET ORAL
OUTPATIENT
Start: 2023-04-13

## 2023-04-04 RX ORDER — SODIUM CHLORIDE 9 MG/ML
INJECTION, SOLUTION INTRAVENOUS CONTINUOUS
OUTPATIENT
Start: 2023-04-13

## 2023-04-04 RX ORDER — ALBUTEROL SULFATE 90 UG/1
4 AEROSOL, METERED RESPIRATORY (INHALATION) PRN
OUTPATIENT
Start: 2023-04-13

## 2023-04-04 RX ORDER — ACETAMINOPHEN 325 MG/1
650 TABLET ORAL ONCE
OUTPATIENT
Start: 2023-04-13 | End: 2023-04-13

## 2023-04-13 ENCOUNTER — HOSPITAL ENCOUNTER (OUTPATIENT)
Facility: HOSPITAL | Age: 63
Setting detail: INFUSION SERIES
End: 2023-04-13

## 2023-04-13 DIAGNOSIS — N04.9 NEPHROTIC SYNDROME: Primary | ICD-10-CM

## 2023-04-18 DIAGNOSIS — E29.1 HYPOGONADISM MALE: ICD-10-CM

## 2023-04-18 RX ORDER — TESTOSTERONE 10 MG/.5G
GEL, METERED TOPICAL
Qty: 3 EACH | Refills: 1 | Status: SHIPPED | OUTPATIENT
Start: 2023-04-18

## 2023-04-20 ENCOUNTER — TELEPHONE (OUTPATIENT)
Dept: ENDOCRINOLOGY | Age: 63
End: 2023-04-20

## 2023-04-22 DIAGNOSIS — E29.1 HYPOGONADISM MALE: Primary | ICD-10-CM

## 2023-04-23 DIAGNOSIS — E29.1 HYPOGONADISM MALE: Primary | ICD-10-CM

## 2023-04-24 DIAGNOSIS — E29.1 HYPOGONADISM MALE: Primary | ICD-10-CM

## 2023-09-25 DIAGNOSIS — E29.1 TESTICULAR HYPOFUNCTION: Primary | ICD-10-CM

## 2023-09-25 RX ORDER — TESTOSTERONE 10 MG/.5G
GEL, METERED TOPICAL
Qty: 3 EACH | Refills: 1 | Status: SHIPPED | OUTPATIENT
Start: 2023-09-25 | End: 2024-03-25

## 2024-01-02 DIAGNOSIS — E29.1 HYPOGONADISM MALE: ICD-10-CM

## 2024-01-08 LAB — HGB BLD-MCNC: 15.9 G/DL (ref 13–17.7)

## 2024-01-09 LAB
PSA SERPL-MCNC: 1.4 NG/ML (ref 0–4)
TESTOST SERPL-MCNC: 1146 NG/DL (ref 264–916)

## 2024-01-16 ENCOUNTER — OFFICE VISIT (OUTPATIENT)
Age: 64
End: 2024-01-16
Payer: COMMERCIAL

## 2024-01-16 DIAGNOSIS — E29.1 HYPOGONADISM MALE: Primary | ICD-10-CM

## 2024-01-16 PROCEDURE — 99442 PR PHYS/QHP TELEPHONE EVALUATION 11-20 MIN: CPT | Performed by: INTERNAL MEDICINE

## 2024-01-16 NOTE — PATIENT INSTRUCTIONS
1) Decrease your testosterone to 1 pump daily for the next 2 months.    2) Your hemoglobin (red blood cell count) is normal at 15.9 but higher than last year and your PSA (prostate cancer blood test) was normal at 1.4 but higher than last year likely due to your testosterone level being above the normal range.    3) I will mail you 2 lab slips, one for 2 months and one to bring to "[facility] to use to have your labs drawn in the 1-2 weeks prior to your next visit.  Please make a note on your calendar in 2 months and at least 3-4 days prior to your visit to have your labs drawn that also reminds you to bring the lab slip in case [facility] does not have the orders on file. Thanks.    4) Please come for a follow up visit on 1/17/25 at 1:30pm in our [place] office.

## 2024-01-16 NOTE — PROGRESS NOTES
Chief Complaint   Patient presents with    Hypogonadism     Pcp and pharmacy verified.  TELEPHONE CALL [phone] (M)       **THIS IS A VIRTUAL VISIT VIA A TELEPHONE ENCOUNTER.  PATIENT AGREED TO HAVE THEIR CARE DELIVERED OVER THE PHONE IN PLACE OF THEIR REGULARLY SCHEDULED OFFICE VISIT**    History of Present Illness: Santana Sevilla is a 63 y.o. male here for follow up of hypogonadism.  Weight is about 212-214 down from 220 lbs.  Has still been applying 2 pumps of testosterone daily and feels well on this dose.  No trouble with urinary symptoms.  He retired over the past year so now he can come any day of the week and not just [place] day like he has over the past few years.  He would like to decrease his dose to 1 pump daily rather than alternating 2 pumps and one pumps.    Current Outpatient Medications   Medication Sig    NONFORMULARY Ruxience 1000 mg infusions every 6 months (15 days apart)    Testosterone 10 MG/ACT (2%) GEL Apply 2 pumps daily    aspirin 81 MG chewable tablet Take 1 tablet by mouth daily    bumetanide (BUMEX) 1 MG tablet Take 0.5 tablets by mouth as needed    pravastatin (PRAVACHOL) 20 MG tablet Take 1 tablet by mouth nightly    valsartan (DIOVAN) 160 MG tablet Take 1 tablet by mouth daily    vitamin E 45 MG (100 UNIT) capsule Take by mouth daily    zolpidem (AMBIEN) 5 MG tablet Take by mouth.     No current facility-administered medications for this visit.     Allergies   Allergen Reactions    Midazolam Other (See Comments)     /reasction : panic attack    Carvedilol Other (See Comments)     Headaches and fatigue    Olmesartan Other (See Comments)     Headaches    Pravastatin Other (See Comments)     Headaches and myalgias    Rosuvastatin Other (See Comments)     Headache    Sulfa Antibiotics Hives    Clopidogrel Rash       Review of Systems: PER HPI    Physical Examination: NOT PERFORMED DUE TO THIS BEING A TELEPHONE CALL      Data Reviewed:   Component      Latest Ref Rng 1/8/2024

## 2024-03-16 DIAGNOSIS — E29.1 HYPOGONADISM MALE: ICD-10-CM

## 2024-04-25 DIAGNOSIS — E29.1 TESTICULAR HYPOFUNCTION: ICD-10-CM

## 2024-04-25 RX ORDER — TESTOSTERONE 10 MG/.5G
GEL, METERED TOPICAL
Qty: 3 EACH | Refills: 1 | Status: SHIPPED | OUTPATIENT
Start: 2024-04-25 | End: 2024-10-24

## 2024-04-26 ENCOUNTER — TELEPHONE (OUTPATIENT)
Age: 64
End: 2024-04-26

## 2024-04-26 NOTE — TELEPHONE ENCOUNTER
PA initiated through [email or web address] for Testosterone 10 MG/ACT (2%) GEL     Testosterone approved 04/26/2024 - 04/26/2025 Case ID: [id number]. Pt notified via [employer]

## 2024-05-06 LAB
TESTOST FREE SERPL-MCNC: 4.7 PG/ML (ref 6.6–18.1)
TESTOST SERPL-MCNC: 490 NG/DL (ref 264–916)

## 2024-11-24 DIAGNOSIS — E29.1 HYPOGONADISM MALE: Primary | ICD-10-CM

## 2024-11-24 RX ORDER — TESTOSTERONE 1.62 MG/G
2 GEL TRANSDERMAL DAILY
Qty: 225 G | Refills: 1 | Status: SHIPPED | OUTPATIENT
Start: 2024-11-24 | End: 2025-05-23

## 2024-12-07 ENCOUNTER — TELEPHONE (OUTPATIENT)
Age: 64
End: 2024-12-07

## 2024-12-08 NOTE — TELEPHONE ENCOUNTER
Please print off his last office note and fax with the form for a PA for his testosterone 1.62% gel to [facility].  Thanks!

## 2024-12-10 NOTE — TELEPHONE ENCOUNTER
Testosterone 20.25/1.25 gel pump approved 12/09/2024 - 12/09/2025 PA #: [id number]. Pt notified via MyChart

## 2025-01-03 DIAGNOSIS — E29.1 HYPOGONADISM MALE: ICD-10-CM

## 2025-01-08 LAB
HCT VFR BLD AUTO: 51.3 % (ref 37.5–51)
HGB BLD-MCNC: 17.6 G/DL (ref 13–17.7)
PSA SERPL-MCNC: 1.3 NG/ML (ref 0–4)

## 2025-01-13 LAB
TESTOST FREE SERPL-MCNC: 3.5 PG/ML (ref 6.6–18.1)
TESTOST SERPL-MCNC: 349 NG/DL (ref 264–916)

## 2025-01-17 ENCOUNTER — OFFICE VISIT (OUTPATIENT)
Age: 65
End: 2025-01-17
Payer: COMMERCIAL

## 2025-01-17 VITALS
HEIGHT: 69 IN | SYSTOLIC BLOOD PRESSURE: 117 MMHG | DIASTOLIC BLOOD PRESSURE: 72 MMHG | HEART RATE: 95 BPM | WEIGHT: 229.2 LBS | BODY MASS INDEX: 33.95 KG/M2

## 2025-01-17 DIAGNOSIS — E29.1 HYPOGONADISM MALE: Primary | ICD-10-CM

## 2025-01-17 PROCEDURE — 99214 OFFICE O/P EST MOD 30 MIN: CPT | Performed by: INTERNAL MEDICINE

## 2025-01-17 RX ORDER — DAPAGLIFLOZIN 10 MG/1
10 TABLET, FILM COATED ORAL EVERY MORNING
COMMUNITY

## 2025-01-17 NOTE — PROGRESS NOTES
hours after applying the gel.   I will send you a message through [employer] with your lab results and we'll determine if we should stay on androgel or go back to fortesta.    2) Your hemoglobin was at the highest part of the normal range and your hematocrit was just out of the normal range and this may be from untreated sleep apnea so I agree with a sleep evaluation.      3) Your PSA (prostate cancer blood test) is normal.      4) Your blood pressure looks great.      Return in about 1 year (around 1/17/2026).     Copy sent to:  Calixto Nina, MICHELE - Jhoan Kincadi Jr., MD (Cardiology)  Korey Rahman DO (Nephrology)  The patient (or guardian, if applicable) and other individuals in attendance with the patient were advised that Artificial Intelligence will be utilized during this visit to record, process the conversation to generate a clinical note, and support improvement of the AI technology. The patient (or guardian, if applicable) and other individuals in attendance at the appointment consented to the use of AI, including the recording.

## 2025-01-17 NOTE — PATIENT INSTRUCTIONS
1) Your testosterone is normal at 349 but lower than last year so try switching to the generic androgel 2 pumps per day for the next 3-4 weeks and then repeat your labs about 3-4 hours after applying the gel.   I will send you a message through [employer] with your lab results and we'll determine if we should stay on androgel or go back to fortesta.    2) Your hemoglobin was at the highest part of the normal range and your hematocrit was just out of the normal range and this may be from untreated sleep apnea so I agree with a sleep evaluation.

## 2025-02-07 DIAGNOSIS — E29.1 HYPOGONADISM MALE: ICD-10-CM

## 2025-02-22 LAB
TESTOST FREE SERPL-MCNC: 4.6 PG/ML (ref 6.6–18.1)
TESTOST SERPL-MCNC: 454 NG/DL (ref 264–916)

## 2025-03-11 ENCOUNTER — PATIENT MESSAGE (OUTPATIENT)
Age: 65
End: 2025-03-11

## 2025-03-11 DIAGNOSIS — E29.1 HYPOGONADISM MALE: ICD-10-CM

## 2025-03-11 RX ORDER — TESTOSTERONE 1.62 MG/G
2 GEL TRANSDERMAL DAILY
Qty: 225 G | Refills: 1 | Status: SHIPPED | OUTPATIENT
Start: 2025-03-11 | End: 2025-09-07

## 2025-08-04 DIAGNOSIS — E29.1 HYPOGONADISM MALE: ICD-10-CM

## 2025-08-04 RX ORDER — TESTOSTERONE 1.62 MG/G
2 GEL TRANSDERMAL DAILY
Qty: 225 G | Refills: 1 | Status: SHIPPED | OUTPATIENT
Start: 2025-08-04 | End: 2026-01-31

## 2025-08-19 ENCOUNTER — TELEPHONE (OUTPATIENT)
Age: 65
End: 2025-08-19

## 2025-08-19 DIAGNOSIS — E29.1 HYPOGONADISM MALE: ICD-10-CM

## 2025-08-19 RX ORDER — TESTOSTERONE 1.62 MG/G
2 GEL TRANSDERMAL DAILY
Qty: 225 G | Refills: 1 | Status: SHIPPED | OUTPATIENT
Start: 2025-08-19 | End: 2026-02-15